# Patient Record
Sex: MALE | Race: ASIAN | NOT HISPANIC OR LATINO | Employment: FULL TIME | URBAN - METROPOLITAN AREA
[De-identification: names, ages, dates, MRNs, and addresses within clinical notes are randomized per-mention and may not be internally consistent; named-entity substitution may affect disease eponyms.]

---

## 2024-09-19 ENCOUNTER — HOSPITAL ENCOUNTER (OUTPATIENT)
Facility: HOSPITAL | Age: 49
Discharge: HOME | End: 2024-09-19
Attending: STUDENT IN AN ORGANIZED HEALTH CARE EDUCATION/TRAINING PROGRAM | Admitting: STUDENT IN AN ORGANIZED HEALTH CARE EDUCATION/TRAINING PROGRAM
Payer: MEDICAID

## 2024-09-19 ENCOUNTER — ANESTHESIA EVENT (OUTPATIENT)
Dept: ENDOSCOPY | Facility: HOSPITAL | Age: 49
End: 2024-09-19
Payer: MEDICAID

## 2024-09-19 ENCOUNTER — ANESTHESIA (OUTPATIENT)
Dept: ENDOSCOPY | Facility: HOSPITAL | Age: 49
End: 2024-09-19
Payer: MEDICAID

## 2024-09-19 DIAGNOSIS — Z12.11 COLON CANCER SCREENING: Primary | ICD-10-CM

## 2024-09-19 DIAGNOSIS — Z12.11 SCREENING FOR COLON CANCER: ICD-10-CM

## 2024-09-19 PROBLEM — K63.5 COLON POLYPS: Status: ACTIVE | Noted: 2024-09-19

## 2024-09-19 PROBLEM — K57.90 DIVERTICULOSIS: Status: ACTIVE | Noted: 2024-09-19

## 2024-09-19 PROCEDURE — 37000009 HC ANESTHESIA EA ADD 15 MINS: Performed by: STUDENT IN AN ORGANIZED HEALTH CARE EDUCATION/TRAINING PROGRAM

## 2024-09-19 PROCEDURE — 25000003 PHARM REV CODE 250: Performed by: STUDENT IN AN ORGANIZED HEALTH CARE EDUCATION/TRAINING PROGRAM

## 2024-09-19 PROCEDURE — 27201089 HC SNARE, DISP (ANY): Performed by: STUDENT IN AN ORGANIZED HEALTH CARE EDUCATION/TRAINING PROGRAM

## 2024-09-19 PROCEDURE — 45385 COLONOSCOPY W/LESION REMOVAL: CPT | Mod: 33 | Performed by: STUDENT IN AN ORGANIZED HEALTH CARE EDUCATION/TRAINING PROGRAM

## 2024-09-19 PROCEDURE — 88305 TISSUE EXAM BY PATHOLOGIST: CPT | Performed by: PATHOLOGY

## 2024-09-19 PROCEDURE — 63600175 PHARM REV CODE 636 W HCPCS: Performed by: STUDENT IN AN ORGANIZED HEALTH CARE EDUCATION/TRAINING PROGRAM

## 2024-09-19 PROCEDURE — 27201028 HC NEEDLE, SCLERO: Performed by: STUDENT IN AN ORGANIZED HEALTH CARE EDUCATION/TRAINING PROGRAM

## 2024-09-19 PROCEDURE — 45390 COLONOSCOPY W/RESECTION: CPT | Mod: 59 | Performed by: STUDENT IN AN ORGANIZED HEALTH CARE EDUCATION/TRAINING PROGRAM

## 2024-09-19 PROCEDURE — 27202363 HC INJECTION AGENT, SUBMUCOSAL, ANY: Performed by: STUDENT IN AN ORGANIZED HEALTH CARE EDUCATION/TRAINING PROGRAM

## 2024-09-19 PROCEDURE — 45385 COLONOSCOPY W/LESION REMOVAL: CPT | Mod: ,,, | Performed by: STUDENT IN AN ORGANIZED HEALTH CARE EDUCATION/TRAINING PROGRAM

## 2024-09-19 PROCEDURE — 45390 COLONOSCOPY W/RESECTION: CPT | Mod: 59,,, | Performed by: STUDENT IN AN ORGANIZED HEALTH CARE EDUCATION/TRAINING PROGRAM

## 2024-09-19 PROCEDURE — 37000008 HC ANESTHESIA 1ST 15 MINUTES: Performed by: STUDENT IN AN ORGANIZED HEALTH CARE EDUCATION/TRAINING PROGRAM

## 2024-09-19 PROCEDURE — 88305 TISSUE EXAM BY PATHOLOGIST: CPT | Mod: 26,,, | Performed by: PATHOLOGY

## 2024-09-19 RX ORDER — LIDOCAINE HYDROCHLORIDE 10 MG/ML
INJECTION, SOLUTION EPIDURAL; INFILTRATION; INTRACAUDAL; PERINEURAL
Status: DISCONTINUED | OUTPATIENT
Start: 2024-09-19 | End: 2024-09-19

## 2024-09-19 RX ORDER — DEXTROMETHORPHAN/PSEUDOEPHED 2.5-7.5/.8
DROPS ORAL
Status: DISCONTINUED | OUTPATIENT
Start: 2024-09-19 | End: 2024-09-19 | Stop reason: HOSPADM

## 2024-09-19 RX ORDER — SODIUM CHLORIDE, SODIUM LACTATE, POTASSIUM CHLORIDE, CALCIUM CHLORIDE 600; 310; 30; 20 MG/100ML; MG/100ML; MG/100ML; MG/100ML
INJECTION, SOLUTION INTRAVENOUS CONTINUOUS PRN
Status: DISCONTINUED | OUTPATIENT
Start: 2024-09-19 | End: 2024-09-19

## 2024-09-19 RX ORDER — PROPOFOL 10 MG/ML
VIAL (ML) INTRAVENOUS
Status: DISCONTINUED | OUTPATIENT
Start: 2024-09-19 | End: 2024-09-19

## 2024-09-19 RX ADMIN — SODIUM CHLORIDE, SODIUM LACTATE, POTASSIUM CHLORIDE, AND CALCIUM CHLORIDE: 600; 310; 30; 20 INJECTION, SOLUTION INTRAVENOUS at 08:09

## 2024-09-19 RX ADMIN — PROPOFOL 100 MG: 10 INJECTION, EMULSION INTRAVENOUS at 08:09

## 2024-09-19 RX ADMIN — PROPOFOL 50 MG: 10 INJECTION, EMULSION INTRAVENOUS at 08:09

## 2024-09-19 RX ADMIN — LIDOCAINE HYDROCHLORIDE 50 MG: 10 SOLUTION INTRAVENOUS at 08:09

## 2024-09-19 NOTE — PROVATION PATIENT INSTRUCTIONS
Discharge Summary/Instructions after an Endoscopic Procedure  Patient Name: Yazan Hathaway  Patient MRN: [id number]  Patient YOB: 1975 Thursday, September 19, 2024 Melania Muñiz MD  Dear patient,  As a result of recent federal legislation (The Federal Cures Act), you may   receive lab or pathology results from your procedure in your [facility]   account before your physician is able to contact you. Your physician or   their representative will relay the results to you with their   recommendations at their soonest availability.  Thank you,  RESTRICTIONS:  During your procedure today, you received medications for sedation.  These   medications may affect your judgment, balance and coordination.  Therefore,   for 24 hours, you have the following restrictions:   - DO NOT drive a car, operate machinery, make legal/financial decisions,   sign important papers or drink alcohol.    ACTIVITY:  Today: no heavy lifting, straining or running due to procedural   sedation/anesthesia.  The following day: return to full activity including work.  DIET:  Eat and drink normally unless instructed otherwise.     TREATMENT FOR COMMON SIDE EFFECTS:  - Mild abdominal pain, nausea, belching, bloating or excessive gas:  rest,   eat lightly and use a heating pad.  - Sore Throat: treat with throat lozenges and/or gargle with warm salt   water.  - Because air was used during the procedure, expelling large amounts of air   from your rectum or belching is normal.  - If a bowel prep was taken, you may not have a bowel movement for 1-3 days.    This is normal.  SYMPTOMS TO WATCH FOR AND REPORT TO YOUR PHYSICIAN:  1. Abdominal pain or bloating, other than gas cramps.  2. Chest pain.  3. Back pain.  4. Signs of infection such as: chills or fever occurring within 24 hours   after the procedure.  5. Rectal bleeding, which would show as bright red, maroon, or black stools.   (A tablespoon of blood from the rectum is not serious, especially if    hemorrhoids are present.)  6. Vomiting.  7. Weakness or dizziness.  GO DIRECTLY TO THE NEAREST EMERGENCY ROOM IF YOU HAVE ANY OF THE FOLLOWING:      Difficulty breathing              Chills and/or fever over 101 F   Persistent vomiting and/or vomiting blood   Severe abdominal pain   Severe chest pain   Black, tarry stools   Bleeding- more than one tablespoon   Any other symptom or condition that you feel may need urgent attention  Your doctor recommends these additional instructions:  If any biopsies were taken, your doctors clinic will contact you in 1 to 2   weeks with any results.  - Discharge patient to home (ambulatory).   - Patient has a contact number available for emergencies.  The signs and   symptoms of potential delayed complications were discussed with the   patient.  Return to normal activities tomorrow.  Written discharge   instructions were provided to the patient.   - Resume previous diet.   - Continue present medications.   - Return to primary care physician as previously scheduled.   - Repeat colonoscopy in 5 years for surveillance.  For questions, problems or results please call your physician Melania Muñiz MD at Work:  [phone]  If you have any questions about the above instructions, call the GI   department at [phone] or call the endoscopy unit at [phone]   from 7am until 3 pm.  [facility] [place], EMERGENCY ROOM PHONE NUMBER:   [phone]  IF A COMPLICATION OR EMERGENCY SITUATION ARISES AND YOU ARE UNABLE TO REACH   YOUR PHYSICIAN - GO DIRECTLY TO THE EMERGENCY ROOM.  I have read or have had read to me these discharge instructions for my   procedure and have received a written copy.  I understand these   instructions and will follow-up with my physician if I have any questions.     __________________________________       _____________________________________  Nurse Signature                                          Patient/Designated   Responsible  Party Signature  Melania Muñiz MD  9/19/2024 9:10:57 AM  This report has been verified and signed electronically.  Dear patient,  As a result of recent federal legislation (The Federal Cures Act), you may   receive lab or pathology results from your procedure in your [facility]   account before your physician is able to contact you. Your physician or   their representative will relay the results to you with their   recommendations at their soonest availability.  Thank you,  PROVATION

## 2024-09-19 NOTE — BRIEF OP NOTE
[facility])  Brief Operative Note     SUMMARY     Surgery Date: 9/19/2024     Surgeons and Role:     * Melania Muñiz MD - Primary    Assisting Surgeon: None    Pre-op Diagnosis:  Colon cancer screening [Z12.11]    Post-op Diagnosis:  Post-Op Diagnosis Codes:     * Colon cancer screening [Z12.11]    Procedure(s) (LRB):  COLONOSCOPY (N/A)    Anesthesia: Choice    Description of the findings of the procedure: See Op Note    Findings/Key Components: multiple polyps, diverticulosis    Estimated Blood Loss: * No values recorded between 9/19/2024  8:39 AM and 9/19/2024  9:07 AM *         Specimens:   Specimen (24h ago, onward)       Start     Ordered    09/19/24 0846  Specimen to Pathology, Surgery Gastrointestinal tract  Once        Comments: Pre-op Diagnosis: Colon cancer screening [Z12.11]Procedure(s):COLONOSCOPY 1.  Rectum polypectomy2.  Splenic flexure polypectomy     References:    Click here for ordering Quick Tip   Question Answer Comment   Procedure Type: Gastrointestinal tract    Release to patient Immediate        09/19/24 0906                    Discharge Note    SUMMARY     Admit Date: 9/19/2024    Discharge Date and Time: 9/19/2024 9:11 AM    Hospital Course Patient was seen in the preoperative area by both myself and anesthesia. All consents were verified and all questions appropriately answered. All risks, benefits and alternatives explained to patient. Patient proceeded to endoscopy suite for colonoscopy and was discharged home postoperative once cleared by anesthesia.    Final Diagnosis: Post-Op Diagnosis Codes:     * Colon cancer screening [Z12.11]    Disposition: Home or Self Care    Follow Up/Patient Instructions: See Provation report    Medications:  Reconciled Home Medications:      Medication List        CONTINUE taking these medications      atorvastatin 10 MG tablet  Commonly known as: LIPITOR  Take 1 tablet (10 mg total) by mouth every evening.     meloxicam 15 MG  tablet  Commonly known as: MOBIC  Take 1 tablet (15 mg total) by mouth daily as needed for Pain.            Discharge Procedure Orders   Diet general

## 2024-09-19 NOTE — H&P
[facility] ([facility])  Colon and Rectal Surgery  History & Physical    Patient Name: Yazan Hathaway  MRN: [id number]  Admission Date: 9/19/2024  Attending Physician: Melania Muñiz MD  Primary Care Provider: Simi Johnson MD    Patient information was obtained from patient and medical records.    Subjective:     Chief Complaint/Reason for Admission: Here for Colonoscopy    History of Present Illness:  Patient is a 49 y.o. male presents for colonoscopy. Never had one prior    Denies changes in bowel habits such as bleeding, melena, painful bowel movements, change in caliber of stool, diarrhea or constipation.    Denies FH of colorectal cancer, polyps or IBD       No current facility-administered medications on file prior to encounter.     Current Outpatient Medications on File Prior to Encounter   Medication Sig    atorvastatin (LIPITOR) 10 MG tablet Take 1 tablet (10 mg total) by mouth every evening.    meloxicam (MOBIC) 15 MG tablet Take 1 tablet (15 mg total) by mouth daily as needed for Pain.       Review of patient's allergies indicates:   Allergen Reactions    Beef containing products     Shellfish containing products        Past Medical History:   Diagnosis Date    Chronic knee pain     Hyperlipidemia     Lower leg fracture     Right, as a child     Past Surgical History:   Procedure Laterality Date    NONE       Family History       Problem Relation (Age of Onset)    Allergies Son    Diabetes Mother, Brother    Hypertension Brother          Tobacco Use    Smoking status: Every Day     Current packs/day: 0.50     Average packs/day: 0.5 packs/day for 30.0 years (15.0 ttl pk-yrs)     Types: Cigarettes    Smokeless tobacco: Never   Substance and Sexual Activity    Alcohol use: Yes     Comment: Monthly or less    Drug use: Not on file    Sexual activity: Yes     Partners: Female       Objective:     Vital Signs (Most Recent):  Temp: 98.1 °F (36.7 °C) (09/19/24 0827)  Pulse: 73 (09/19/24 0827)  Resp:  18 (09/19/24 0827)  BP: (!) 131/96 (09/19/24 0827)  SpO2: 97 % (09/19/24 0827) Vital Signs (24h Range):  Temp:  [98.1 °F (36.7 °C)] 98.1 °F (36.7 °C)  Pulse:  [73] 73  Resp:  [18] 18  SpO2:  [97 %] 97 %  BP: (131)/(96) 131/96     Weight: 65.8 kg (145 lb)  Body mass index is 24.89 kg/m².    Physical Exam  Constitutional:       Appearance: He is well-developed.   HENT:      Head: Normocephalic and atraumatic.   Eyes:      Conjunctiva/sclera: Conjunctivae normal.      Pupils: Pupils are equal, round, and reactive to light.   Neck:      Thyroid: No thyromegaly.   Cardiovascular:      Rate and Rhythm: Normal rate and regular rhythm.   Pulmonary:      Effort: Pulmonary effort is normal. No respiratory distress.   Abdominal:      General: There is no distension.      Palpations: Abdomen is soft. There is no mass.      Tenderness: There is no abdominal tenderness.   Musculoskeletal:         General: No tenderness. Normal range of motion.      Cervical back: Normal range of motion.   Skin:     General: Skin is warm and dry.      Capillary Refill: Capillary refill takes less than 2 seconds.   Neurological:      General: No focal deficit present.      Mental Status: He is alert and oriented to person, place, and time.           Assessment/Plan:     Patient is a 49 y.o. male who presents for colonoscopy     - Ok to proceed to endoscopy suite for colonoscopy  - Consent obtained. All risks, benefits and alternatives fully explained to patient, including but not limited to bleeding, infection, perforation, and missed polyps. All questions appropriately answered to patient's satisfaction. Consent signed and placed on chart.    There are no hospital problems to display for this patient.    VTE Risk Mitigation (From admission, onward)      None            Melania Muñiz MD  Colon and Rectal Surgery  [facility] ([facility])

## 2024-09-19 NOTE — PLAN OF CARE
Dr Muñiz came to bedside and discussed findings. NO N/V,  no abdominal pain, no GI bleeding, and vitals stable.  Pt discharged from unit.

## 2024-09-19 NOTE — ANESTHESIA POSTPROCEDURE EVALUATION
Anesthesia Post Evaluation    Patient: Yazan Hathaway    Procedure(s) Performed: Procedure(s) (LRB):  COLONOSCOPY (N/A)    Final Anesthesia Type: MAC      Patient location during evaluation: PACU  Patient participation: Yes- Able to Participate  Level of consciousness: awake and alert and oriented  Post-procedure vital signs: reviewed and stable  Pain management: adequate  Airway patency: patent  BROCK mitigation strategies: Multimodal analgesia and Extubation and recovery carried out in lateral, semiupright, or other nonsupine position  PONV status at discharge: No PONV  Anesthetic complications: no      Cardiovascular status: blood pressure returned to baseline and hemodynamically stable  Respiratory status: unassisted and spontaneous ventilation  Hydration status: euvolemic  Follow-up not needed.  Comments: Report given to PACU RN. Hand Off Tool Used. RN given opportunity to ask questions or clarify concerns. No Concerns verbalized. Pt. Left in stable condition. SV. Vital Signs Return to Near Baseline. No s/s of distress noted.               Vitals Value Taken Time   BP 94/63 09/19/24 0910   Temp 36.7 °C (98 °F) 09/19/24 0910   Pulse 60 09/19/24 0910   Resp 17 09/19/24 0910   SpO2 98 % 09/19/24 0910         No case tracking events are documented in the log.      Pain/Darcy Score: No data recorded

## 2024-09-19 NOTE — TRANSFER OF CARE
"Anesthesia Transfer of Care Note    Patient: Yazan Hathaway    Procedure(s) Performed: Procedure(s) (LRB):  COLONOSCOPY (N/A)    Patient location: PACU    Anesthesia Type: MAC    Transport from OR: Transported from OR on room air with adequate spontaneous ventilation    Post pain: adequate analgesia    Post assessment: no apparent anesthetic complications    Post vital signs: stable    Level of consciousness: responds to stimulation and awake    Nausea/Vomiting: no nausea/vomiting    Complications: none    Transfer of care protocol was followedComments: Report given to PACU RN at bedside. Hand off tool used. RN given opportunity to ask questions or clarify concerns. No Concerns verbalized. RN was asked if ready to assume care of patient. RN verbally confirmed. Pt. left in stable condition. SV. Vital Signs Return to Near Baseline. No s/s of distress noted.       Last vitals: Visit Vitals  BP 94/63   Pulse 60   Temp 36.7 °C (98 °F)   Resp 17   Ht 5' 4" (1.626 m)   Wt 65.8 kg (145 lb)   SpO2 98%   BMI 24.89 kg/m²     "

## 2024-09-19 NOTE — ANESTHESIA PREPROCEDURE EVALUATION
09/19/2024  Yazan Hathaway is a 49 y.o., male.      Pre-op Assessment    I have reviewed the Patient Summary Reports.          Review of Systems  Social:  Smoker       Cardiovascular:                hyperlipidemia                             Pulmonary:  Pulmonary Normal                       Renal/:  Renal/ Normal                 Hepatic/GI:  Bowel Prep.                Musculoskeletal:  Musculoskeletal Normal                Neurological:  Neurology Normal                                      Endocrine:  Diabetes, type 2    Diabetes                      Psych:  Psychiatric Normal                    Physical Exam  General: Well nourished, Cooperative, Alert and Oriented    Airway:  Mallampati: II   Mouth Opening: Normal  TM Distance: Normal  Tongue: Normal  Neck ROM: Normal ROM    Dental:  Intact        Anesthesia Plan  Type of Anesthesia, risks & benefits discussed:    Anesthesia Type: MAC, Gen Natural Airway  Intra-op Monitoring Plan: Standard ASA Monitors  Post Op Pain Control Plan: IV/PO Opioids PRN  Induction:  IV  Informed Consent: Informed consent signed with the Patient and all parties understand the risks and agree with anesthesia plan.  All questions answered.   ASA Score: 2  Day of Surgery Review of History & Physical: H&P Update referred to the surgeon/provider.    Ready For Surgery From Anesthesia Perspective.     .

## 2024-09-20 VITALS
HEART RATE: 62 BPM | OXYGEN SATURATION: 97 % | HEIGHT: 64 IN | BODY MASS INDEX: 24.75 KG/M2 | SYSTOLIC BLOOD PRESSURE: 110 MMHG | TEMPERATURE: 98 F | DIASTOLIC BLOOD PRESSURE: 89 MMHG | RESPIRATION RATE: 17 BRPM | WEIGHT: 145 LBS

## 2024-09-20 LAB
FINAL PATHOLOGIC DIAGNOSIS: NORMAL
GROSS: NORMAL
Lab: NORMAL

## 2024-09-20 NOTE — PROGRESS NOTES
Yazan Hathaway  MRN:  [id number]  49 y.o. male      SUBJECTIVE:     CHIEF COMPLAINT:  - Follow-up of recently diagnosed type 2 diabetes  - Right knee pain    HPI:  Mr. Hathaway was diagnosed with type 2 diabetes during a checkup in May, with an initial A1C of 6.6. Since then, he has made dietary changes, including reducing rice consumption and avoiding it before bedtime. He has lost approximately 7 lbs, decreasing from 152 to 144 lbs. The patient reports infrequent blood glucose level monitoring.    The patient has right knee pain during prolonged walking or squatting. A recent vacation involving extensive walking exacerbated his knee pain. He underwent surgery for a meniscus tear 2 years ago, where the orthopedist performed a meniscectomy. The patient reports minimal relief from the procedure and underwent physical therapy for several months post-surgery. He has been taking meloxicam for knee pain but reports diminished efficacy in alleviating discomfort.        Review of Systems   Constitutional:  Positive for weight loss. Negative for chills, diaphoresis, fever and malaise/fatigue.   Eyes: Negative.    Respiratory: Negative.     Cardiovascular: Negative.    Gastrointestinal: Negative.    Genitourinary:  Negative for frequency and urgency.   Musculoskeletal:  Positive for joint pain.   Skin: Negative.    Neurological: Negative.        Review of patient's allergies indicates:   Allergen Reactions    Beef containing products     Shellfish containing products        Patient Active Problem List   Diagnosis    Hyperlipidemia    Overweight with body mass index (BMI) of 25 to 25.9 in adult    Smoker    New onset type 2 diabetes mellitus    Colon polyps    Diverticulosis       Current Outpatient Medications   Medication Instructions    atorvastatin (LIPITOR) 10 mg, Oral, Nightly    meloxicam (MOBIC) 15 mg, Oral, Daily PRN         Past medical, surgical, family and social histories have been reviewed today.      OBJECTIVE:  "    Vitals:    09/23/24 1331   BP: 112/60   Pulse: 75   Resp: 18   Temp: 97.3 °F (36.3 °C)   TempSrc: Tympanic   SpO2: 97%   Weight: 65.5 kg (144 lb 8.2 oz)   Height: 5' 4" (1.626 m)     Vitals:    09/23/24 1331   PainSc: 0-No pain       Physical Exam  Vitals reviewed.   Constitutional:       General: He is not in acute distress.  Eyes:      Pupils: Pupils are equal, round, and reactive to light.   Cardiovascular:      Rate and Rhythm: Normal rate and regular rhythm.      Pulses: Normal pulses.      Heart sounds: Normal heart sounds.   Pulmonary:      Effort: Pulmonary effort is normal.      Breath sounds: Normal breath sounds.   Musculoskeletal:         General: Normal range of motion.      Cervical back: Normal range of motion and neck supple. No rigidity.      Right knee: Crepitus present. No swelling, deformity or effusion. Normal range of motion. Tenderness present. Normal alignment and normal patellar mobility. Normal pulse.      Right lower leg: No edema.      Left lower leg: No edema.        Legs:    Skin:     Capillary Refill: Capillary refill takes less than 2 seconds.   Neurological:      Mental Status: He is alert and oriented to person, place, and time.      Motor: No weakness.      Gait: Gait normal.   Psychiatric:         Mood and Affect: Mood normal.         Behavior: Behavior normal.         Thought Content: Thought content normal.         Judgment: Judgment normal.           Results for orders placed or performed in visit on 09/23/24   POCT Glucose, Hand-Held Device   Result Value Ref Range    POC Glucose 121 (A) 70 - 110 MG/DL       ASSESSMENT:     1. Type 2 diabetes mellitus without complication, without long-term current use of insulin ----- newly dx 5/2024, currently tx with healthy diet/lifestyle changes.  Weight loss noted since last visit, intentional.    -     POCT Glucose, Hand-Held Device  -     Hemoglobin A1C; Future; Expected date: 11/27/2024  -     Microalbumin/Creatinine Ratio, Urine; " Future; Expected date: 09/23/2024    Lab Results   Component Value Date     05/21/2024    K 4.2 05/21/2024     05/21/2024    CO2 21 (L) 05/21/2024    BUN 17 05/21/2024    CREATININE 0.7 05/21/2024    CALCIUM 9.5 05/21/2024    ANIONGAP 11 05/21/2024    EGFRNORACEVR >60.0 05/21/2024       Lab Results   Component Value Date    HGBA1C 6.6 (H) 05/27/2024       2. Right knee pain, unspecified chronicity ----- chronic intermittent issue with h/o right knee surgery.  Update XR today.  Pain control and knee care discussed.  -     X-ray Knee Ortho Right; Future; Expected date: 09/23/2024        PLAN:     Urine and XR today, pending.  A1c due 11/27/24.  RTC as directed and/or prn.        KANCHAN Alicea  [facility]       30 minutes of total time spent on the encounter, which includes face to face time and non-face to face time preparing to see the patient.  This includes obtaining and/or reviewing separately obtained history, performing a medically appropriate examination and/or evaluation, and counseling and educating the patient/family/caregiver.  Includes documenting clinical information in the electronic or other health record, independently interpreting results (not separately reported) and communicating results to the patient/family/caregiver, with care coordination (not separately reported).  Medications, tests and/or procedures ordered as necessary along with referring and communicating with other health professionals (when not separately reported).    This note was generated with the assistance of ambient listening technology. Verbal consent was obtained by the patient and accompanying visitor(s) for the recording of patient appointment to facilitate this note. I attest to having reviewed and edited the generated note for accuracy, though some syntax or spelling errors may persist. Please contact the author of this note for any clarification.

## 2024-09-23 ENCOUNTER — HOSPITAL ENCOUNTER (OUTPATIENT)
Dept: RADIOLOGY | Facility: HOSPITAL | Age: 49
Discharge: HOME | End: 2024-09-23
Attending: REGISTERED NURSE
Payer: MEDICAID

## 2024-09-23 ENCOUNTER — OFFICE VISIT (OUTPATIENT)
Dept: FAMILY MEDICINE | Facility: CLINIC | Age: 49
End: 2024-09-23
Payer: MEDICAID

## 2024-09-23 VITALS
WEIGHT: 144.5 LBS | TEMPERATURE: 97 F | OXYGEN SATURATION: 97 % | DIASTOLIC BLOOD PRESSURE: 60 MMHG | HEIGHT: 64 IN | SYSTOLIC BLOOD PRESSURE: 112 MMHG | BODY MASS INDEX: 24.67 KG/M2 | HEART RATE: 75 BPM | RESPIRATION RATE: 18 BRPM

## 2024-09-23 DIAGNOSIS — M25.561 RIGHT KNEE PAIN, UNSPECIFIED CHRONICITY: ICD-10-CM

## 2024-09-23 DIAGNOSIS — E11.9 TYPE 2 DIABETES MELLITUS WITHOUT COMPLICATION, WITHOUT LONG-TERM CURRENT USE OF INSULIN: Primary | ICD-10-CM

## 2024-09-23 LAB — GLUCOSE SERPL-MCNC: 121 MG/DL (ref 70–110)

## 2024-09-23 PROCEDURE — 99999 PR PBB SHADOW E&M-EST. PATIENT-LVL III: CPT | Mod: PBBFAC,,, | Performed by: REGISTERED NURSE

## 2024-09-23 PROCEDURE — 73560 X-RAY EXAM OF KNEE 1 OR 2: CPT | Mod: TC,FY,PO,LT

## 2024-09-23 PROCEDURE — 73562 X-RAY EXAM OF KNEE 3: CPT | Mod: 26,59,LT, | Performed by: RADIOLOGY

## 2024-09-23 PROCEDURE — 3078F DIAST BP <80 MM HG: CPT | Mod: CPTII,,, | Performed by: REGISTERED NURSE

## 2024-09-23 PROCEDURE — 3008F BODY MASS INDEX DOCD: CPT | Mod: CPTII,,, | Performed by: REGISTERED NURSE

## 2024-09-23 PROCEDURE — 82962 GLUCOSE BLOOD TEST: CPT | Mod: PBBFAC,PO | Performed by: REGISTERED NURSE

## 2024-09-23 PROCEDURE — 99213 OFFICE O/P EST LOW 20 MIN: CPT | Mod: PBBFAC,25,PO | Performed by: REGISTERED NURSE

## 2024-09-23 PROCEDURE — 3044F HG A1C LEVEL LT 7.0%: CPT | Mod: CPTII,,, | Performed by: REGISTERED NURSE

## 2024-09-23 PROCEDURE — 73564 X-RAY EXAM KNEE 4 OR MORE: CPT | Mod: 26,RT,, | Performed by: RADIOLOGY

## 2024-09-23 PROCEDURE — 3074F SYST BP LT 130 MM HG: CPT | Mod: CPTII,,, | Performed by: REGISTERED NURSE

## 2024-09-23 PROCEDURE — 99214 OFFICE O/P EST MOD 30 MIN: CPT | Mod: S$PBB,,, | Performed by: REGISTERED NURSE

## 2024-09-23 PROCEDURE — 3061F NEG MICROALBUMINURIA REV: CPT | Mod: CPTII,,, | Performed by: REGISTERED NURSE

## 2024-09-23 PROCEDURE — 73562 X-RAY EXAM OF KNEE 3: CPT | Mod: TC,FY,PO,RT

## 2024-09-23 PROCEDURE — G2211 COMPLEX E/M VISIT ADD ON: HCPCS | Mod: S$PBB,,, | Performed by: REGISTERED NURSE

## 2024-09-23 PROCEDURE — 3066F NEPHROPATHY DOC TX: CPT | Mod: CPTII,,, | Performed by: REGISTERED NURSE

## 2024-09-23 PROCEDURE — 99999PBSHW POCT GLUCOSE, HAND-HELD DEVICE: Mod: PBBFAC,,,

## 2024-10-16 ENCOUNTER — PATIENT MESSAGE (OUTPATIENT)
Dept: RESEARCH | Facility: HOSPITAL | Age: 49
End: 2024-10-16
Payer: MEDICAID

## 2024-11-05 ENCOUNTER — PATIENT MESSAGE (OUTPATIENT)
Dept: RESEARCH | Facility: HOSPITAL | Age: 49
End: 2024-11-05
Payer: MEDICAID

## 2024-12-01 DIAGNOSIS — M25.569 KNEE PAIN, UNSPECIFIED CHRONICITY, UNSPECIFIED LATERALITY: ICD-10-CM

## 2024-12-02 RX ORDER — MELOXICAM 15 MG/1
15 TABLET ORAL DAILY PRN
Qty: 90 TABLET | Refills: 0 | Status: SHIPPED | OUTPATIENT
Start: 2024-12-02

## 2024-12-30 ENCOUNTER — TELEPHONE (OUTPATIENT)
Dept: FAMILY MEDICINE | Facility: CLINIC | Age: 49
End: 2024-12-30
Payer: MEDICAID

## 2024-12-30 NOTE — TELEPHONE ENCOUNTER
----- Message from Martha sent at 12/30/2024  1:15 PM CST -----  Contact: Joel  Patient needing to schedule an appointment for routine blood work, please call back [phone]

## 2024-12-31 ENCOUNTER — TELEPHONE (OUTPATIENT)
Dept: FAMILY MEDICINE | Facility: CLINIC | Age: 49
End: 2024-12-31
Payer: MEDICAID

## 2024-12-31 NOTE — TELEPHONE ENCOUNTER
----- Message from Casandra sent at 12/30/2024  3:56 PM CST -----  Contact: Joel Maldonado needs a call back at [phone] in regards to rescheduling appointment on 01/23. She stated that they will be out of town on that day and wont be able to make it.    Thanks

## 2025-03-15 ENCOUNTER — HOSPITAL ENCOUNTER (EMERGENCY)
Facility: HOSPITAL | Age: 50
Discharge: HOME | End: 2025-03-15
Attending: EMERGENCY MEDICINE
Payer: MEDICAID

## 2025-03-15 VITALS
DIASTOLIC BLOOD PRESSURE: 58 MMHG | RESPIRATION RATE: 20 BRPM | BODY MASS INDEX: 25.4 KG/M2 | WEIGHT: 148 LBS | TEMPERATURE: 99 F | SYSTOLIC BLOOD PRESSURE: 100 MMHG | OXYGEN SATURATION: 95 % | HEART RATE: 91 BPM

## 2025-03-15 DIAGNOSIS — R06.2 WHEEZING: Primary | ICD-10-CM

## 2025-03-15 DIAGNOSIS — R06.02 SHORTNESS OF BREATH: ICD-10-CM

## 2025-03-15 DIAGNOSIS — R06.02 SOB (SHORTNESS OF BREATH): ICD-10-CM

## 2025-03-15 LAB
ALBUMIN SERPL BCP-MCNC: 3.9 G/DL (ref 3.5–5.2)
ALP SERPL-CCNC: 71 U/L (ref 40–150)
ALT SERPL W/O P-5'-P-CCNC: 24 U/L (ref 10–44)
ANION GAP SERPL CALC-SCNC: 14 MMOL/L (ref 8–16)
AST SERPL-CCNC: 17 U/L (ref 10–40)
BASOPHILS # BLD AUTO: 0.05 K/UL (ref 0–0.2)
BASOPHILS NFR BLD: 0.5 % (ref 0–1.9)
BILIRUB SERPL-MCNC: 0.3 MG/DL (ref 0.1–1)
BNP SERPL-MCNC: <10 PG/ML (ref 0–99)
BUN SERPL-MCNC: 15 MG/DL (ref 6–20)
CALCIUM SERPL-MCNC: 9 MG/DL (ref 8.7–10.5)
CHLORIDE SERPL-SCNC: 107 MMOL/L (ref 95–110)
CO2 SERPL-SCNC: 18 MMOL/L (ref 23–29)
CREAT SERPL-MCNC: 0.7 MG/DL (ref 0.5–1.4)
DIFFERENTIAL METHOD BLD: ABNORMAL
EOSINOPHIL # BLD AUTO: 0 K/UL (ref 0–0.5)
EOSINOPHIL NFR BLD: 0.3 % (ref 0–8)
ERYTHROCYTE [DISTWIDTH] IN BLOOD BY AUTOMATED COUNT: 13.9 % (ref 11.5–14.5)
EST. GFR  (NO RACE VARIABLE): >60 ML/MIN/1.73 M^2
GLUCOSE SERPL-MCNC: 144 MG/DL (ref 70–110)
HCT VFR BLD AUTO: 46.6 % (ref 40–54)
HGB BLD-MCNC: 15.9 G/DL (ref 14–18)
IMM GRANULOCYTES # BLD AUTO: 0.06 K/UL (ref 0–0.04)
IMM GRANULOCYTES NFR BLD AUTO: 0.6 % (ref 0–0.5)
INFLUENZA A, MOLECULAR: NEGATIVE
INFLUENZA B, MOLECULAR: NEGATIVE
LYMPHOCYTES # BLD AUTO: 0.8 K/UL (ref 1–4.8)
LYMPHOCYTES NFR BLD: 7.6 % (ref 18–48)
MCH RBC QN AUTO: 29.7 PG (ref 27–31)
MCHC RBC AUTO-ENTMCNC: 34.1 G/DL (ref 32–36)
MCV RBC AUTO: 87 FL (ref 82–98)
MONOCYTES # BLD AUTO: 0.4 K/UL (ref 0.3–1)
MONOCYTES NFR BLD: 4 % (ref 4–15)
NEUTROPHILS # BLD AUTO: 8.8 K/UL (ref 1.8–7.7)
NEUTROPHILS NFR BLD: 87 % (ref 38–73)
NRBC BLD-RTO: 0 /100 WBC
OHS QRS DURATION: 90 MS
OHS QTC CALCULATION: 445 MS
PLATELET # BLD AUTO: 196 K/UL (ref 150–450)
PMV BLD AUTO: 9.7 FL (ref 9.2–12.9)
POTASSIUM SERPL-SCNC: 3.9 MMOL/L (ref 3.5–5.1)
PROT SERPL-MCNC: 6.6 G/DL (ref 6–8.4)
RBC # BLD AUTO: 5.36 M/UL (ref 4.6–6.2)
SARS-COV-2 RDRP RESP QL NAA+PROBE: NEGATIVE
SODIUM SERPL-SCNC: 139 MMOL/L (ref 136–145)
SPECIMEN SOURCE: NORMAL
TROPONIN I SERPL DL<=0.01 NG/ML-MCNC: <0.006 NG/ML (ref 0–0.03)
WBC # BLD AUTO: 10.07 K/UL (ref 3.9–12.7)

## 2025-03-15 PROCEDURE — 99285 EMERGENCY DEPT VISIT HI MDM: CPT | Mod: 25

## 2025-03-15 PROCEDURE — 93005 ELECTROCARDIOGRAM TRACING: CPT

## 2025-03-15 PROCEDURE — 96374 THER/PROPH/DIAG INJ IV PUSH: CPT

## 2025-03-15 PROCEDURE — 85025 COMPLETE CBC W/AUTO DIFF WBC: CPT | Performed by: EMERGENCY MEDICINE

## 2025-03-15 PROCEDURE — 87635 SARS-COV-2 COVID-19 AMP PRB: CPT | Performed by: EMERGENCY MEDICINE

## 2025-03-15 PROCEDURE — 80053 COMPREHEN METABOLIC PANEL: CPT | Performed by: EMERGENCY MEDICINE

## 2025-03-15 PROCEDURE — 25000242 PHARM REV CODE 250 ALT 637 W/ HCPCS: Performed by: EMERGENCY MEDICINE

## 2025-03-15 PROCEDURE — 87502 INFLUENZA DNA AMP PROBE: CPT | Performed by: EMERGENCY MEDICINE

## 2025-03-15 PROCEDURE — 63600175 PHARM REV CODE 636 W HCPCS: Mod: JZ,TB | Performed by: EMERGENCY MEDICINE

## 2025-03-15 PROCEDURE — 93010 ELECTROCARDIOGRAM REPORT: CPT | Mod: ,,, | Performed by: INTERNAL MEDICINE

## 2025-03-15 PROCEDURE — 84484 ASSAY OF TROPONIN QUANT: CPT | Performed by: EMERGENCY MEDICINE

## 2025-03-15 PROCEDURE — 83880 ASSAY OF NATRIURETIC PEPTIDE: CPT | Performed by: EMERGENCY MEDICINE

## 2025-03-15 RX ORDER — PREDNISONE 10 MG/1
40 TABLET ORAL DAILY
Qty: 20 TABLET | Refills: 0 | Status: SHIPPED | OUTPATIENT
Start: 2025-03-15 | End: 2025-03-20

## 2025-03-15 RX ORDER — IPRATROPIUM BROMIDE AND ALBUTEROL SULFATE 2.5; .5 MG/3ML; MG/3ML
3 SOLUTION RESPIRATORY (INHALATION)
Status: COMPLETED | OUTPATIENT
Start: 2025-03-15 | End: 2025-03-15

## 2025-03-15 RX ORDER — METHYLPREDNISOLONE SOD SUCC 125 MG
125 VIAL (EA) INJECTION
Status: COMPLETED | OUTPATIENT
Start: 2025-03-15 | End: 2025-03-15

## 2025-03-15 RX ORDER — ALBUTEROL SULFATE 90 UG/1
1-2 INHALANT RESPIRATORY (INHALATION) EVERY 4 HOURS PRN
Qty: 8 G | Refills: 0 | Status: SHIPPED | OUTPATIENT
Start: 2025-03-15 | End: 2026-03-15

## 2025-03-15 RX ADMIN — IPRATROPIUM BROMIDE AND ALBUTEROL SULFATE 3 ML: 2.5; .5 SOLUTION RESPIRATORY (INHALATION) at 12:03

## 2025-03-15 RX ADMIN — METHYLPREDNISOLONE SODIUM SUCCINATE 125 MG: 125 INJECTION, POWDER, FOR SOLUTION INTRAMUSCULAR; INTRAVENOUS at 12:03

## 2025-03-15 NOTE — ED PROVIDER NOTES
SCRIBE #1 NOTE: I, Kenna Rosen, am scribing for, and in the presence of, Estbean Maxwell MD. I have scribed the entire note.       History     Chief Complaint   Patient presents with    Shortness of Breath     SOB that's worsened over past 2 days, accessory muscle use; cough, congestion     Review of patient's allergies indicates:   Allergen Reactions    Beef containing products     Shellfish containing products          History of Present Illness     HPI    3/15/2025, 12:37 AM  History obtained from the patient and spouse      History of Present Illness: Yazan Hathaway is a 49 y.o. male patient with a PMHx of HLD who presents to the Emergency Department for evaluation of increased SOB which onset 2 days ago. Pt is an active smoker. Symptoms are constant and moderate in severity. Pt reports laying down worsens SOB. Deep inspiration does not help. Pt reports deep inspiration is short and feels like it is not a full breath. Associated sxs include productive cough. Patient denies any chest pain, fever, sore throat, rhinorrhea, and all other sxs at this time. Prior Tx includes inhaler with no relief. No further complaints or concerns at this time.       Arrival mode: Personal vehicle    PCP: Simi Johnson MD        Past Medical History:  Past Medical History:   Diagnosis Date    Chronic knee pain     Hyperlipidemia     Lower leg fracture     Right, as a child       Past Surgical History:  Past Surgical History:   Procedure Laterality Date    COLONOSCOPY N/A 9/19/2024    Procedure: COLONOSCOPY;  Surgeon: Melania Muñiz MD;  Location: [facility];  Service: Gastroenterology;  Laterality: N/A;    NONE           Family History:  Family History   Problem Relation Name Age of Onset    Diabetes Mother      Hypertension Brother      Diabetes Brother      Allergies Son         Social History:  Social History     Tobacco Use    Smoking status: Every Day     Current packs/day: 0.50     Average packs/day: 0.5 packs/day  for 30.0 years (15.0 ttl pk-yrs)     Types: Cigarettes    Smokeless tobacco: Never   Substance and Sexual Activity    Alcohol use: Yes     Comment: Monthly or less    Drug use: Never    Sexual activity: Yes     Partners: Female        Review of Systems     Review of Systems   Constitutional:  Negative for fever.   HENT:  Negative for rhinorrhea and sore throat.    Respiratory:  Positive for cough (Productive) and shortness of breath.    Cardiovascular:  Negative for chest pain.   Gastrointestinal:  Negative for nausea.   Genitourinary:  Negative for dysuria.   Musculoskeletal:  Negative for back pain.   Skin:  Negative for rash.   Neurological:  Negative for weakness.   Hematological:  Does not bruise/bleed easily.   All other systems reviewed and are negative.     Physical Exam     Initial Vitals [03/15/25 0014]   BP Pulse Resp Temp SpO2   (!) 143/83 88 16 98.8 °F (37.1 °C) (!) 93 %      MAP       --          Physical Exam  Nursing Notes and Vital Signs Reviewed.  Constitutional: Patient is in no acute distress. Well-developed and well-nourished.  Head: Atraumatic. Normocephalic.  Eyes: PERRL. EOM intact. Conjunctivae are not pale. No scleral icterus.  ENT: Mucous membranes are moist. Oropharynx is clear and symmetric.    Neck: Supple. Full ROM. No lymphadenopathy.  Cardiovascular: Regular rate. Regular rhythm. No murmurs, rubs, or gallops. Distal pulses are 2+ and symmetric.  Pulmonary/Chest: No respiratory distress. Bilateral wheezing.  Abdominal: Soft and non-distended.  There is no tenderness.  No rebound, guarding, or rigidity. Good bowel sounds.  Genitourinary: No CVA tenderness  Musculoskeletal: Moves all extremities. No obvious deformities. No edema. No calf tenderness.  Skin: Warm and dry.  Neurological:  Alert, awake, and appropriate.  Normal speech.  No acute focal neurological deficits are appreciated.  Psychiatric: Normal affect. Good eye contact. Appropriate in content.     ED Course   Procedures  ED  Vital Signs:  Vitals:    03/15/25 0014 03/15/25 0032 03/15/25 0044 03/15/25 0057   BP: (!) 143/83 (!) 128/96     Pulse: 88 88 85 80   Resp: 16 (!) 21 (!) 22 20   Temp: 98.8 °F (37.1 °C)      TempSrc: Oral      SpO2: (!) 93% 95% 95% 95%   Weight: 67.1 kg (148 lb)       03/15/25 0114 03/15/25 0148   BP:  (!) 100/58   Pulse: 89 91   Resp: (!) 24 20   Temp:     TempSrc:     SpO2: (!) 94% 95%   Weight:         Abnormal Lab Results:  Labs Reviewed   CBC W/ AUTO DIFFERENTIAL - Abnormal       Result Value    WBC 10.07      RBC 5.36      Hemoglobin 15.9      Hematocrit 46.6      MCV 87      MCH 29.7      MCHC 34.1      RDW 13.9      Platelets 196      MPV 9.7      Immature Granulocytes 0.6 (*)     Gran # (ANC) 8.8 (*)     Immature Grans (Abs) 0.06 (*)     Lymph # 0.8 (*)     Mono # 0.4      Eos # 0.0      Baso # 0.05      nRBC 0      Gran % 87.0 (*)     Lymph % 7.6 (*)     Mono % 4.0      Eosinophil % 0.3      Basophil % 0.5      Differential Method Automated     COMPREHENSIVE METABOLIC PANEL - Abnormal    Sodium 139      Potassium 3.9      Chloride 107      CO2 18 (*)     Glucose 144 (*)     BUN 15      Creatinine 0.7      Calcium 9.0      Total Protein 6.6      Albumin 3.9      Total Bilirubin 0.3      Alkaline Phosphatase 71      AST 17      ALT 24      eGFR >60      Anion Gap 14     INFLUENZA A & B BY MOLECULAR    Influenza A, Molecular Negative      Influenza B, Molecular Negative      Flu A & B Source Nasal swab     SARS-COV-2 RNA AMPLIFICATION, QUAL    SARS-CoV-2 RNA, Amplification, Qual Negative     B-TYPE NATRIURETIC PEPTIDE    BNP <10     TROPONIN I    Troponin I <0.006          All Lab Results:  Results for orders placed or performed during the hospital encounter of 03/15/25   Influenza A & B by Molecular    Collection Time: 03/15/25 12:39 AM    Specimen: Nasopharyngeal Swab   Result Value Ref Range    Influenza A, Molecular Negative Negative    Influenza B, Molecular Negative Negative    Flu A & B Source Nasal  swab    COVID-19 Rapid Screening    Collection Time: 03/15/25 12:39 AM   Result Value Ref Range    SARS-CoV-2 RNA, Amplification, Qual Negative Negative   CBC auto differential    Collection Time: 03/15/25 12:56 AM   Result Value Ref Range    WBC 10.07 3.90 - 12.70 K/uL    RBC 5.36 4.60 - 6.20 M/uL    Hemoglobin 15.9 14.0 - 18.0 g/dL    Hematocrit 46.6 40.0 - 54.0 %    MCV 87 82 - 98 fL    MCH 29.7 27.0 - 31.0 pg    MCHC 34.1 32.0 - 36.0 g/dL    RDW 13.9 11.5 - 14.5 %    Platelets 196 150 - 450 K/uL    MPV 9.7 9.2 - 12.9 fL    Immature Granulocytes 0.6 (H) 0.0 - 0.5 %    Gran # (ANC) 8.8 (H) 1.8 - 7.7 K/uL    Immature Grans (Abs) 0.06 (H) 0.00 - 0.04 K/uL    Lymph # 0.8 (L) 1.0 - 4.8 K/uL    Mono # 0.4 0.3 - 1.0 K/uL    Eos # 0.0 0.0 - 0.5 K/uL    Baso # 0.05 0.00 - 0.20 K/uL    nRBC 0 0 /100 WBC    Gran % 87.0 (H) 38.0 - 73.0 %    Lymph % 7.6 (L) 18.0 - 48.0 %    Mono % 4.0 4.0 - 15.0 %    Eosinophil % 0.3 0.0 - 8.0 %    Basophil % 0.5 0.0 - 1.9 %    Differential Method Automated    Comprehensive metabolic panel    Collection Time: 03/15/25 12:56 AM   Result Value Ref Range    Sodium 139 136 - 145 mmol/L    Potassium 3.9 3.5 - 5.1 mmol/L    Chloride 107 95 - 110 mmol/L    CO2 18 (L) 23 - 29 mmol/L    Glucose 144 (H) 70 - 110 mg/dL    BUN 15 6 - 20 mg/dL    Creatinine 0.7 0.5 - 1.4 mg/dL    Calcium 9.0 8.7 - 10.5 mg/dL    Total Protein 6.6 6.0 - 8.4 g/dL    Albumin 3.9 3.5 - 5.2 g/dL    Total Bilirubin 0.3 0.1 - 1.0 mg/dL    Alkaline Phosphatase 71 40 - 150 U/L    AST 17 10 - 40 U/L    ALT 24 10 - 44 U/L    eGFR >60 >60 mL/min/1.73 m^2    Anion Gap 14 8 - 16 mmol/L   Brain natriuretic peptide    Collection Time: 03/15/25 12:56 AM   Result Value Ref Range    BNP <10 0 - 99 pg/mL   Troponin I    Collection Time: 03/15/25 12:56 AM   Result Value Ref Range    Troponin I <0.006 0.000 - 0.026 ng/mL       Imaging Results:  Imaging Results              X-Ray Chest AP Portable (Preliminary result)  Result time 03/15/25  02:01:06      Wet Read by Esteban Maxwell MD (03/15/25 02:01:06, [facility] - Emergency Dept., Emergency Medicine)    No acute findings                                     The EKG was ordered, reviewed, and independently interpreted by the ED provider.  Interpretation time: 00:12  Rate: 89 BPM  Rhythm: normal sinus rhythm  Interpretation: Right superior axis deviation. Right ventricular hypertrophy. No STEMI.           The Emergency Provider reviewed the vital signs and test results, which are outlined above.     ED Discussion     1:54 AM: Reassessed pt at this time. Discussed with pt all pertinent ED information and results. Discussed pt dx and plan of tx. Gave pt all f/u and return to the ED instructions. All questions and concerns were addressed at this time. Pt expresses understanding of information and instructions, and is comfortable with plan to discharge. Pt is stable for discharge.    I discussed with patient and/or family/caretaker that evaluation in the ED does not suggest any emergent or life threatening medical conditions requiring immediate intervention beyond what was provided in the ED, and I believe patient is safe for discharge.  Regardless, an unremarkable evaluation in the ED does not preclude the development or presence of a serious of life threatening condition. As such, patient was instructed to return immediately for any worsening or change in current symptoms.    ED Course as of 03/15/25 0216   Sat Mar 15, 2025   0043 DDx includes bronchospasm, viral syndrome, HF, ACS, PNA, PTX, COPD [BA]   0159 Patient feeling better [BA]      ED Course User Index  [BA] Esteban Maxwell MD     Medical Decision Making  Amount and/or Complexity of Data Reviewed  Labs: ordered. Decision-making details documented in ED Course.  Radiology: ordered and independent interpretation performed. Decision-making details documented in ED Course.  ECG/medicine tests: ordered and independent interpretation performed.  Decision-making details documented in ED Course.    Risk  Prescription drug management.                ED Medication(s):  Medications   methylPREDNISolone sodium succinate injection 125 mg (125 mg Intravenous Given 3/15/25 0054)   albuterol-ipratropium 2.5 mg-0.5 mg/3 mL nebulizer solution 3 mL (3 mLs Nebulization Given 3/15/25 0057)       Discharge Medication List as of 3/15/2025  2:01 AM        START taking these medications    Details   albuterol (PROVENTIL/VENTOLIN HFA) 90 mcg/actuation inhaler Inhale 1-2 puffs into the lungs every 4 (four) hours as needed for Wheezing. Rescue, Starting Sat 3/15/2025, Until Sun 3/15/2026 at 2359, Normal      predniSONE (DELTASONE) 10 MG tablet Take 4 tablets (40 mg total) by mouth once daily. for 5 days, Starting Sat 3/15/2025, Until Thu 3/20/2025, Normal              Follow-up Information       Simi Johnson MD. Schedule an appointment as soon as possible for a visit in 2 days.    Specialty: Family Medicine  Why: For re-evaluation and further treatment  Contact information:  [address] [facility] [address] [phone]               [facility].    Specialty: Emergency Medicine  Why: If symptoms worsen, For re-evaluation and further treatment, As needed  Contact information:  [address] [facility] [address] [phone]                               Scribe Attestation:   Scribe #1: I performed the above scribed service and the documentation accurately describes the services I performed. I attest to the accuracy of the note.     Attending:   Physician Attestation Statement for Scribe #1: I, Esteban Maxwell MD, personally performed the services described in this documentation, as scribed by Kenna Rosen, in my presence, and it is both accurate and complete.           Clinical Impression       ICD-10-CM ICD-9-CM   1. Wheezing  R06.2 786.07   2. Shortness of breath  R06.02 786.05   3. SOB (shortness of breath)   R06.02 786.05       Disposition:   Disposition: Discharged  Condition: Stable       Esteban Maxwell MD  03/15/25 0216

## 2025-05-20 DIAGNOSIS — M25.569 KNEE PAIN, UNSPECIFIED CHRONICITY, UNSPECIFIED LATERALITY: ICD-10-CM

## 2025-05-20 RX ORDER — MELOXICAM 15 MG/1
15 TABLET ORAL DAILY PRN
Qty: 90 TABLET | Refills: 0 | Status: SHIPPED | OUTPATIENT
Start: 2025-05-20

## 2025-05-28 DIAGNOSIS — E11.9 TYPE 2 DIABETES MELLITUS WITHOUT COMPLICATION: ICD-10-CM

## 2025-05-29 ENCOUNTER — PATIENT OUTREACH (OUTPATIENT)
Dept: ADMINISTRATIVE | Facility: HOSPITAL | Age: 50
End: 2025-05-29
Payer: MEDICAID

## 2025-06-03 ENCOUNTER — LAB VISIT (OUTPATIENT)
Dept: LAB | Facility: HOSPITAL | Age: 50
End: 2025-06-03
Attending: FAMILY MEDICINE
Payer: MEDICAID

## 2025-06-03 ENCOUNTER — PATIENT MESSAGE (OUTPATIENT)
Dept: CARDIOLOGY | Facility: HOSPITAL | Age: 50
End: 2025-06-03
Payer: MEDICAID

## 2025-06-03 ENCOUNTER — OFFICE VISIT (OUTPATIENT)
Dept: FAMILY MEDICINE | Facility: CLINIC | Age: 50
End: 2025-06-03
Payer: MEDICAID

## 2025-06-03 VITALS
HEART RATE: 70 BPM | DIASTOLIC BLOOD PRESSURE: 80 MMHG | WEIGHT: 146.19 LBS | TEMPERATURE: 97 F | SYSTOLIC BLOOD PRESSURE: 132 MMHG | OXYGEN SATURATION: 98 % | BODY MASS INDEX: 25.09 KG/M2

## 2025-06-03 DIAGNOSIS — R53.83 FATIGUE, UNSPECIFIED TYPE: ICD-10-CM

## 2025-06-03 DIAGNOSIS — R06.09 EXERTIONAL DYSPNEA: ICD-10-CM

## 2025-06-03 DIAGNOSIS — F17.200 TOBACCO USE DISORDER: ICD-10-CM

## 2025-06-03 DIAGNOSIS — E66.3 OVERWEIGHT WITH BODY MASS INDEX (BMI) OF 25 TO 25.9 IN ADULT: ICD-10-CM

## 2025-06-03 DIAGNOSIS — Z00.00 PREVENTATIVE HEALTH CARE: ICD-10-CM

## 2025-06-03 DIAGNOSIS — Z12.5 PROSTATE CANCER SCREENING: ICD-10-CM

## 2025-06-03 DIAGNOSIS — Z23 NEED FOR VACCINATION: ICD-10-CM

## 2025-06-03 DIAGNOSIS — Z00.00 PREVENTATIVE HEALTH CARE: Primary | ICD-10-CM

## 2025-06-03 DIAGNOSIS — E78.5 HYPERLIPIDEMIA, UNSPECIFIED HYPERLIPIDEMIA TYPE: ICD-10-CM

## 2025-06-03 DIAGNOSIS — F17.200 NEEDS SMOKING CESSATION EDUCATION: ICD-10-CM

## 2025-06-03 PROBLEM — E11.9 TYPE 2 DIABETES MELLITUS WITHOUT COMPLICATION, WITHOUT LONG-TERM CURRENT USE OF INSULIN: Chronic | Status: ACTIVE | Noted: 2024-05-22

## 2025-06-03 LAB
ABSOLUTE EOSINOPHIL (OHS): 0.16 K/UL
ABSOLUTE MONOCYTE (OHS): 0.71 K/UL (ref 0.3–1)
ABSOLUTE NEUTROPHIL COUNT (OHS): 6.72 K/UL (ref 1.8–7.7)
ALBUMIN SERPL BCP-MCNC: 4.3 G/DL (ref 3.5–5.2)
ALBUMIN/CREAT UR: 5.4 UG/MG
ALP SERPL-CCNC: 63 UNIT/L (ref 40–150)
ALT SERPL W/O P-5'-P-CCNC: 24 UNIT/L (ref 10–44)
ANION GAP (OHS): 11 MMOL/L (ref 8–16)
AST SERPL-CCNC: 13 UNIT/L (ref 11–45)
BASOPHILS # BLD AUTO: 0.07 K/UL
BASOPHILS NFR BLD AUTO: 0.6 %
BILIRUB SERPL-MCNC: 0.5 MG/DL (ref 0.1–1)
BUN SERPL-MCNC: 14 MG/DL (ref 6–20)
CALCIUM SERPL-MCNC: 9.3 MG/DL (ref 8.7–10.5)
CHLORIDE SERPL-SCNC: 110 MMOL/L (ref 95–110)
CHOLEST SERPL-MCNC: 179 MG/DL (ref 120–199)
CHOLEST/HDLC SERPL: 2.9 {RATIO} (ref 2–5)
CO2 SERPL-SCNC: 21 MMOL/L (ref 23–29)
CREAT SERPL-MCNC: 0.6 MG/DL (ref 0.5–1.4)
CREAT UR-MCNC: 111 MG/DL (ref 23–375)
EAG (OHS): 140 MG/DL (ref 68–131)
ERYTHROCYTE [DISTWIDTH] IN BLOOD BY AUTOMATED COUNT: 14 % (ref 11.5–14.5)
GFR SERPLBLD CREATININE-BSD FMLA CKD-EPI: >60 ML/MIN/1.73/M2
GLUCOSE SERPL-MCNC: 103 MG/DL (ref 70–110)
HBA1C MFR BLD: 6.5 % (ref 4–5.6)
HCT VFR BLD AUTO: 52.5 % (ref 40–54)
HDLC SERPL-MCNC: 62 MG/DL (ref 40–75)
HDLC SERPL: 34.6 % (ref 20–50)
HGB BLD-MCNC: 16.6 GM/DL (ref 14–18)
IMM GRANULOCYTES # BLD AUTO: 0.06 K/UL (ref 0–0.04)
IMM GRANULOCYTES NFR BLD AUTO: 0.5 % (ref 0–0.5)
LDLC SERPL CALC-MCNC: 96.6 MG/DL (ref 63–159)
LYMPHOCYTES # BLD AUTO: 4 K/UL (ref 1–4.8)
MCH RBC QN AUTO: 28.7 PG (ref 27–31)
MCHC RBC AUTO-ENTMCNC: 31.6 G/DL (ref 32–36)
MCV RBC AUTO: 91 FL (ref 82–98)
MICROALBUMIN UR-MCNC: 6 UG/ML (ref ?–5000)
NONHDLC SERPL-MCNC: 117 MG/DL
NUCLEATED RBC (/100WBC) (OHS): 0 /100 WBC
PLATELET # BLD AUTO: 240 K/UL (ref 150–450)
PMV BLD AUTO: 11.2 FL (ref 9.2–12.9)
POTASSIUM SERPL-SCNC: 4 MMOL/L (ref 3.5–5.1)
PROT SERPL-MCNC: 7 GM/DL (ref 6–8.4)
PSA SERPL-MCNC: 0.24 NG/ML
RBC # BLD AUTO: 5.78 M/UL (ref 4.6–6.2)
RELATIVE EOSINOPHIL (OHS): 1.4 %
RELATIVE LYMPHOCYTE (OHS): 34.1 % (ref 18–48)
RELATIVE MONOCYTE (OHS): 6.1 % (ref 4–15)
RELATIVE NEUTROPHIL (OHS): 57.3 % (ref 38–73)
SODIUM SERPL-SCNC: 142 MMOL/L (ref 136–145)
TRIGL SERPL-MCNC: 102 MG/DL (ref 30–150)
TSH SERPL-ACNC: 1.06 UIU/ML (ref 0.4–4)
WBC # BLD AUTO: 11.72 K/UL (ref 3.9–12.7)

## 2025-06-03 PROCEDURE — 84443 ASSAY THYROID STIM HORMONE: CPT

## 2025-06-03 PROCEDURE — 83036 HEMOGLOBIN GLYCOSYLATED A1C: CPT

## 2025-06-03 PROCEDURE — 80053 COMPREHEN METABOLIC PANEL: CPT

## 2025-06-03 PROCEDURE — 1159F MED LIST DOCD IN RCRD: CPT | Mod: CPTII,,, | Performed by: FAMILY MEDICINE

## 2025-06-03 PROCEDURE — 85025 COMPLETE CBC W/AUTO DIFF WBC: CPT

## 2025-06-03 PROCEDURE — 3079F DIAST BP 80-89 MM HG: CPT | Mod: CPTII,,, | Performed by: FAMILY MEDICINE

## 2025-06-03 PROCEDURE — 99396 PREV VISIT EST AGE 40-64: CPT | Mod: S$PBB,,, | Performed by: FAMILY MEDICINE

## 2025-06-03 PROCEDURE — 84153 ASSAY OF PSA TOTAL: CPT

## 2025-06-03 PROCEDURE — 36415 COLL VENOUS BLD VENIPUNCTURE: CPT | Mod: PO

## 2025-06-03 PROCEDURE — 3075F SYST BP GE 130 - 139MM HG: CPT | Mod: CPTII,,, | Performed by: FAMILY MEDICINE

## 2025-06-03 PROCEDURE — 82043 UR ALBUMIN QUANTITATIVE: CPT | Performed by: FAMILY MEDICINE

## 2025-06-03 PROCEDURE — 99213 OFFICE O/P EST LOW 20 MIN: CPT | Mod: PBBFAC,PO | Performed by: FAMILY MEDICINE

## 2025-06-03 PROCEDURE — 99999 PR PBB SHADOW E&M-EST. PATIENT-LVL III: CPT | Mod: PBBFAC,,, | Performed by: FAMILY MEDICINE

## 2025-06-03 PROCEDURE — 3008F BODY MASS INDEX DOCD: CPT | Mod: CPTII,,, | Performed by: FAMILY MEDICINE

## 2025-06-03 PROCEDURE — 80061 LIPID PANEL: CPT

## 2025-06-03 RX ORDER — ATORVASTATIN CALCIUM 10 MG/1
10 TABLET, FILM COATED ORAL NIGHTLY
Qty: 90 TABLET | Refills: 3 | Status: SHIPPED | OUTPATIENT
Start: 2025-06-03

## 2025-06-04 ENCOUNTER — RESULTS FOLLOW-UP (OUTPATIENT)
Dept: FAMILY MEDICINE | Facility: CLINIC | Age: 50
End: 2025-06-04

## 2025-06-12 ENCOUNTER — TELEPHONE (OUTPATIENT)
Dept: FAMILY MEDICINE | Facility: CLINIC | Age: 50
End: 2025-06-12
Payer: MEDICAID

## 2025-06-12 DIAGNOSIS — Z23 NEED FOR VACCINATION: Primary | ICD-10-CM

## 2025-06-12 NOTE — TELEPHONE ENCOUNTER
Pt states Monday 6/16/   PROVIDER:[TOKEN:[[id number]],FOLLOWUP:[2 weeks]] PROVIDER:[TOKEN:[[id number]],FOLLOWUP:[2 weeks]],PROVIDER:[TOKEN:[[id number]],FOLLOWUP:[2 weeks],ESTABLISHEDPATIENT:[T]]

## 2025-06-16 ENCOUNTER — CLINICAL SUPPORT (OUTPATIENT)
Dept: FAMILY MEDICINE | Facility: CLINIC | Age: 50
End: 2025-06-16
Payer: MEDICAID

## 2025-06-16 ENCOUNTER — TELEPHONE (OUTPATIENT)
Dept: FAMILY MEDICINE | Facility: CLINIC | Age: 50
End: 2025-06-16

## 2025-06-16 DIAGNOSIS — Z91.018 MULTIPLE FOOD ALLERGIES: Primary | ICD-10-CM

## 2025-06-16 DIAGNOSIS — Z23 NEED FOR VACCINATION: Primary | ICD-10-CM

## 2025-06-16 PROCEDURE — 90750 HZV VACC RECOMBINANT IM: CPT | Mod: PBBFAC,PO

## 2025-06-16 PROCEDURE — 99999PBSHW PR PBB SHADOW TECHNICAL ONLY FILED TO HB: Mod: PBBFAC,,,

## 2025-06-16 PROCEDURE — 90471 IMMUNIZATION ADMIN: CPT | Mod: PBBFAC,PO

## 2025-06-16 PROCEDURE — 99211 OFF/OP EST MAY X REQ PHY/QHP: CPT | Mod: PBBFAC,PO

## 2025-06-16 PROCEDURE — 99999 PR PBB SHADOW E&M-EST. PATIENT-LVL I: CPT | Mod: PBBFAC,,,

## 2025-06-16 RX ADMIN — ZOSTER VACCINE RECOMBINANT, ADJUVANTED 0.5 ML: KIT at 01:06

## 2025-06-16 NOTE — PROGRESS NOTES
Pt came in for shingles vaccine. Pt vaccine was given in his left arm. Pt allergies and medication reviewed. Pt advised next dose can be given 2-6 months from now.

## 2025-06-16 NOTE — TELEPHONE ENCOUNTER
Pt came in for a nurse visit today. Pt is requesting a referral for allergy specialist. Pt states he has breakouts when eating certain things. Please advise.

## 2025-06-19 ENCOUNTER — HOSPITAL ENCOUNTER (OUTPATIENT)
Dept: RADIOLOGY | Facility: HOSPITAL | Age: 50
Discharge: HOME | End: 2025-06-19
Attending: FAMILY MEDICINE
Payer: MEDICAID

## 2025-06-19 ENCOUNTER — HOSPITAL ENCOUNTER (OUTPATIENT)
Dept: CARDIOLOGY | Facility: HOSPITAL | Age: 50
Discharge: HOME | End: 2025-06-19
Attending: FAMILY MEDICINE
Payer: MEDICAID

## 2025-06-19 DIAGNOSIS — R06.09 EXERTIONAL DYSPNEA: ICD-10-CM

## 2025-06-19 DIAGNOSIS — R53.83 FATIGUE, UNSPECIFIED TYPE: ICD-10-CM

## 2025-06-19 LAB
CV STRESS BASE HR: 58 BPM
DIASTOLIC BLOOD PRESSURE: 79 MMHG
EJECTION FRACTION- HIGH: 73 %
END DIASTOLIC INDEX-HIGH: 165 ML/M2
END DIASTOLIC INDEX-LOW: 101 ML/M2
END SYSTOLIC INDEX-HIGH: 64 ML/M2
END SYSTOLIC INDEX-LOW: 28 ML/M2
NUC REST EJECTION FRACTION: 65
NUC STRESS EJECTION FRACTION: 74 %
OHS CV CPX 85 PERCENT MAX PREDICTED HEART RATE MALE: 144
OHS CV CPX ESTIMATED METS: 10
OHS CV CPX MAX PREDICTED HEART RATE: 169
OHS CV CPX PATIENT IS FEMALE: 0
OHS CV CPX PATIENT IS MALE: 1
OHS CV CPX PEAK DIASTOLIC BLOOD PRESSURE: 111 MMHG
OHS CV CPX PEAK HEAR RATE: 137 BPM
OHS CV CPX PEAK RATE PRESSURE PRODUCT: NORMAL
OHS CV CPX PEAK SYSTOLIC BLOOD PRESSURE: 203 MMHG
OHS CV CPX PERCENT MAX PREDICTED HEART RATE ACHIEVED: 81
OHS CV CPX RATE PRESSURE PRODUCT PRESENTING: 6554
OHS CV INITIAL DOSE: 10 MCG/KG/MIN
OHS CV PEAK DOSE: 30.5 MCG/KG/MIN
RETIRED EF AND QEF - SEE NOTES: 59 %
STRESS ECHO POST EXERCISE DUR MIN: 8 MINUTES
STRESS ECHO POST EXERCISE DUR SEC: 47 SECONDS
SYSTOLIC BLOOD PRESSURE: 113 MMHG

## 2025-06-19 PROCEDURE — A9502 TC99M TETROFOSMIN: HCPCS | Performed by: FAMILY MEDICINE

## 2025-06-19 PROCEDURE — 93017 CV STRESS TEST TRACING ONLY: CPT

## 2025-06-19 PROCEDURE — 78452 HT MUSCLE IMAGE SPECT MULT: CPT

## 2025-06-19 RX ADMIN — TETROFOSMIN 30.5 MILLICURIE: 1.38 INJECTION, POWDER, LYOPHILIZED, FOR SOLUTION INTRAVENOUS at 01:06

## 2025-06-19 RX ADMIN — TETROFOSMIN 10 MILLICURIE: 1.38 INJECTION, POWDER, LYOPHILIZED, FOR SOLUTION INTRAVENOUS at 11:06

## 2025-07-03 ENCOUNTER — PATIENT OUTREACH (OUTPATIENT)
Dept: ADMINISTRATIVE | Facility: HOSPITAL | Age: 50
End: 2025-07-03
Payer: MEDICAID

## 2025-07-03 DIAGNOSIS — F17.210 NICOTINE DEPENDENCE, CIGARETTES, UNCOMPLICATED: ICD-10-CM

## 2025-07-03 DIAGNOSIS — Z12.2 ENCOUNTER FOR SCREENING FOR MALIGNANT NEOPLASM OF RESPIRATORY ORGANS: Primary | ICD-10-CM

## 2025-07-03 NOTE — PROGRESS NOTES
Working LDCT Report: Chart searched, Last PCP Appt was: 06/2025.   Attempted to contact patient to schedule CHEST CT , spoke with pt's wife, placed order per [facility] and scheduled appt.

## 2025-07-15 ENCOUNTER — HOSPITAL ENCOUNTER (INPATIENT)
Facility: HOSPITAL | Age: 50
LOS: 3 days | Discharge: HOME | DRG: 189 | End: 2025-07-18
Attending: EMERGENCY MEDICINE | Admitting: FAMILY MEDICINE
Payer: MEDICAID

## 2025-07-15 DIAGNOSIS — J96.01 SEPSIS WITH ACUTE HYPOXIC RESPIRATORY FAILURE WITHOUT SEPTIC SHOCK, DUE TO UNSPECIFIED ORGANISM: ICD-10-CM

## 2025-07-15 DIAGNOSIS — R06.02 SOB (SHORTNESS OF BREATH): ICD-10-CM

## 2025-07-15 DIAGNOSIS — R65.20 SEPSIS WITH ACUTE HYPOXIC RESPIRATORY FAILURE WITHOUT SEPTIC SHOCK, DUE TO UNSPECIFIED ORGANISM: ICD-10-CM

## 2025-07-15 DIAGNOSIS — E11.9 TYPE 2 DIABETES MELLITUS WITHOUT COMPLICATION, WITHOUT LONG-TERM CURRENT USE OF INSULIN: Chronic | ICD-10-CM

## 2025-07-15 DIAGNOSIS — A41.9 SEPSIS WITH ACUTE HYPOXIC RESPIRATORY FAILURE WITHOUT SEPTIC SHOCK, DUE TO UNSPECIFIED ORGANISM: ICD-10-CM

## 2025-07-15 DIAGNOSIS — R06.02 SHORTNESS OF BREATH: ICD-10-CM

## 2025-07-15 DIAGNOSIS — J44.1 COPD EXACERBATION: ICD-10-CM

## 2025-07-15 DIAGNOSIS — R91.1 PULMONARY NODULE 1 CM OR GREATER IN DIAMETER: ICD-10-CM

## 2025-07-15 DIAGNOSIS — J43.9 PULMONARY EMPHYSEMA, UNSPECIFIED EMPHYSEMA TYPE: ICD-10-CM

## 2025-07-15 DIAGNOSIS — J96.01 ACUTE HYPOXIC RESPIRATORY FAILURE: Primary | ICD-10-CM

## 2025-07-15 LAB
ABSOLUTE EOSINOPHIL (OHS): 0.04 K/UL
ABSOLUTE MONOCYTE (OHS): 0.62 K/UL (ref 0.3–1)
ABSOLUTE NEUTROPHIL COUNT (OHS): 5.07 K/UL (ref 1.8–7.7)
ALBUMIN SERPL BCP-MCNC: 3.5 G/DL (ref 3.5–5.2)
ALLENS TEST: ABNORMAL
ALP SERPL-CCNC: 66 UNIT/L (ref 40–150)
ALT SERPL W/O P-5'-P-CCNC: 40 UNIT/L (ref 10–44)
ANION GAP (OHS): 9 MMOL/L (ref 8–16)
AST SERPL-CCNC: 24 UNIT/L (ref 11–45)
BACTERIA #/AREA URNS AUTO: NORMAL /HPF
BASOPHILS # BLD AUTO: 0.02 K/UL
BASOPHILS NFR BLD AUTO: 0.3 %
BILIRUB SERPL-MCNC: 0.3 MG/DL (ref 0.1–1)
BILIRUB UR QL STRIP.AUTO: NEGATIVE
BNP SERPL-MCNC: <10 PG/ML (ref 0–99)
BUN SERPL-MCNC: 13 MG/DL (ref 6–20)
CALCIUM SERPL-MCNC: 8.7 MG/DL (ref 8.7–10.5)
CHLORIDE SERPL-SCNC: 107 MMOL/L (ref 95–110)
CLARITY UR: CLEAR
CO2 SERPL-SCNC: 21 MMOL/L (ref 23–29)
COLOR UR AUTO: YELLOW
CREAT SERPL-MCNC: 0.6 MG/DL (ref 0.5–1.4)
DELSYS: ABNORMAL
ERYTHROCYTE [DISTWIDTH] IN BLOOD BY AUTOMATED COUNT: 14.1 % (ref 11.5–14.5)
FLOW: 3
GFR SERPLBLD CREATININE-BSD FMLA CKD-EPI: >60 ML/MIN/1.73/M2
GLUCOSE SERPL-MCNC: 103 MG/DL (ref 70–110)
GLUCOSE UR QL STRIP: NEGATIVE
HCO3 UR-SCNC: 25.9 MMOL/L (ref 24–28)
HCT VFR BLD AUTO: 48 % (ref 40–54)
HGB BLD-MCNC: 15.8 GM/DL (ref 14–18)
HGB UR QL STRIP: ABNORMAL
HYALINE CASTS UR QL AUTO: 0 /LPF (ref 0–1)
IMM GRANULOCYTES # BLD AUTO: 0.04 K/UL (ref 0–0.04)
IMM GRANULOCYTES NFR BLD AUTO: 0.6 % (ref 0–0.5)
INFLUENZA A MOLECULAR (OHS): NEGATIVE
INFLUENZA B MOLECULAR (OHS): NEGATIVE
KETONES UR QL STRIP: NEGATIVE
LACTATE SERPL-SCNC: 0.6 MMOL/L (ref 0.5–2.2)
LACTATE SERPL-SCNC: 1 MMOL/L (ref 0.5–2.2)
LEUKOCYTE ESTERASE UR QL STRIP: NEGATIVE
LYMPHOCYTES # BLD AUTO: 1.42 K/UL (ref 1–4.8)
MAGNESIUM SERPL-MCNC: 1.8 MG/DL (ref 1.6–2.6)
MCH RBC QN AUTO: 29 PG (ref 27–31)
MCHC RBC AUTO-ENTMCNC: 32.9 G/DL (ref 32–36)
MCV RBC AUTO: 88 FL (ref 82–98)
MICROSCOPIC COMMENT: NORMAL
MODE: ABNORMAL
NITRITE UR QL STRIP: NEGATIVE
NUCLEATED RBC (/100WBC) (OHS): 0 /100 WBC
OHS QRS DURATION: 84 MS
OHS QTC CALCULATION: 400 MS
PCO2 BLDA: 44.6 MMHG (ref 35–45)
PH SMN: 7.37 [PH] (ref 7.35–7.45)
PH UR STRIP: 6 [PH]
PLATELET # BLD AUTO: 149 K/UL (ref 150–450)
PMV BLD AUTO: 9.6 FL (ref 9.2–12.9)
PO2 BLDA: 110 MMHG (ref 80–100)
POC BE: 1 MMOL/L (ref -2–2)
POC SATURATED O2: 98 % (ref 95–100)
POCT GLUCOSE: 214 MG/DL (ref 70–110)
POTASSIUM SERPL-SCNC: 3.7 MMOL/L (ref 3.5–5.1)
PROCALCITONIN SERPL-MCNC: 0.07 NG/ML
PROT SERPL-MCNC: 7.4 GM/DL (ref 6–8.4)
PROT UR QL STRIP: ABNORMAL
RBC # BLD AUTO: 5.44 M/UL (ref 4.6–6.2)
RBC #/AREA URNS AUTO: 0 /HPF (ref 0–4)
RELATIVE EOSINOPHIL (OHS): 0.6 %
RELATIVE LYMPHOCYTE (OHS): 19.7 % (ref 18–48)
RELATIVE MONOCYTE (OHS): 8.6 % (ref 4–15)
RELATIVE NEUTROPHIL (OHS): 70.2 % (ref 38–73)
SAMPLE: ABNORMAL
SARS-COV-2 RDRP RESP QL NAA+PROBE: NEGATIVE
SITE: ABNORMAL
SODIUM SERPL-SCNC: 137 MMOL/L (ref 136–145)
SP GR UR STRIP: 1.02
TROPONIN I SERPL DL<=0.01 NG/ML-MCNC: <0.006 NG/ML
UROBILINOGEN UR STRIP-ACNC: NEGATIVE EU/DL
WBC # BLD AUTO: 7.21 K/UL (ref 3.9–12.7)
WBC #/AREA URNS AUTO: 0 /HPF (ref 0–5)

## 2025-07-15 PROCEDURE — 80053 COMPREHEN METABOLIC PANEL: CPT | Performed by: EMERGENCY MEDICINE

## 2025-07-15 PROCEDURE — 25000003 PHARM REV CODE 250: Performed by: FAMILY MEDICINE

## 2025-07-15 PROCEDURE — 94640 AIRWAY INHALATION TREATMENT: CPT | Mod: XB

## 2025-07-15 PROCEDURE — 36415 COLL VENOUS BLD VENIPUNCTURE: CPT | Performed by: EMERGENCY MEDICINE

## 2025-07-15 PROCEDURE — 63700000 PHARM REV CODE 250 ALT 637 W/O HCPCS: Performed by: FAMILY MEDICINE

## 2025-07-15 PROCEDURE — 36600 WITHDRAWAL OF ARTERIAL BLOOD: CPT

## 2025-07-15 PROCEDURE — 84145 PROCALCITONIN (PCT): CPT | Performed by: EMERGENCY MEDICINE

## 2025-07-15 PROCEDURE — 21400001 HC TELEMETRY ROOM

## 2025-07-15 PROCEDURE — 87040 BLOOD CULTURE FOR BACTERIA: CPT | Mod: 91 | Performed by: EMERGENCY MEDICINE

## 2025-07-15 PROCEDURE — 96375 TX/PRO/DX INJ NEW DRUG ADDON: CPT

## 2025-07-15 PROCEDURE — G0378 HOSPITAL OBSERVATION PER HR: HCPCS

## 2025-07-15 PROCEDURE — 93010 ELECTROCARDIOGRAM REPORT: CPT | Mod: ,,, | Performed by: INTERNAL MEDICINE

## 2025-07-15 PROCEDURE — 85025 COMPLETE CBC W/AUTO DIFF WBC: CPT | Performed by: EMERGENCY MEDICINE

## 2025-07-15 PROCEDURE — 96365 THER/PROPH/DIAG IV INF INIT: CPT

## 2025-07-15 PROCEDURE — 63600175 PHARM REV CODE 636 W HCPCS: Performed by: EMERGENCY MEDICINE

## 2025-07-15 PROCEDURE — 27000221 HC OXYGEN, UP TO 24 HOURS

## 2025-07-15 PROCEDURE — U0002 COVID-19 LAB TEST NON-CDC: HCPCS | Performed by: EMERGENCY MEDICINE

## 2025-07-15 PROCEDURE — 63600175 PHARM REV CODE 636 W HCPCS: Mod: JZ,TB | Performed by: FAMILY MEDICINE

## 2025-07-15 PROCEDURE — 83735 ASSAY OF MAGNESIUM: CPT | Performed by: EMERGENCY MEDICINE

## 2025-07-15 PROCEDURE — 25000242 PHARM REV CODE 250 ALT 637 W/ HCPCS: Performed by: FAMILY MEDICINE

## 2025-07-15 PROCEDURE — 83880 ASSAY OF NATRIURETIC PEPTIDE: CPT | Performed by: EMERGENCY MEDICINE

## 2025-07-15 PROCEDURE — 84484 ASSAY OF TROPONIN QUANT: CPT | Performed by: EMERGENCY MEDICINE

## 2025-07-15 PROCEDURE — 81001 URINALYSIS AUTO W/SCOPE: CPT | Performed by: EMERGENCY MEDICINE

## 2025-07-15 PROCEDURE — 83605 ASSAY OF LACTIC ACID: CPT | Performed by: EMERGENCY MEDICINE

## 2025-07-15 PROCEDURE — 94761 N-INVAS EAR/PLS OXIMETRY MLT: CPT | Mod: XB

## 2025-07-15 PROCEDURE — 82803 BLOOD GASES ANY COMBINATION: CPT

## 2025-07-15 PROCEDURE — 99291 CRITICAL CARE FIRST HOUR: CPT

## 2025-07-15 PROCEDURE — 87502 INFLUENZA DNA AMP PROBE: CPT | Performed by: EMERGENCY MEDICINE

## 2025-07-15 PROCEDURE — 25000242 PHARM REV CODE 250 ALT 637 W/ HCPCS: Performed by: EMERGENCY MEDICINE

## 2025-07-15 PROCEDURE — 99900035 HC TECH TIME PER 15 MIN (STAT)

## 2025-07-15 PROCEDURE — 93005 ELECTROCARDIOGRAM TRACING: CPT

## 2025-07-15 PROCEDURE — 25000003 PHARM REV CODE 250: Performed by: EMERGENCY MEDICINE

## 2025-07-15 RX ORDER — IPRATROPIUM BROMIDE AND ALBUTEROL SULFATE 2.5; .5 MG/3ML; MG/3ML
3 SOLUTION RESPIRATORY (INHALATION)
Status: COMPLETED | OUTPATIENT
Start: 2025-07-15 | End: 2025-07-15

## 2025-07-15 RX ORDER — HYDROCODONE BITARTRATE AND ACETAMINOPHEN 5; 325 MG/1; MG/1
1 TABLET ORAL EVERY 4 HOURS PRN
Refills: 0 | Status: DISCONTINUED | OUTPATIENT
Start: 2025-07-15 | End: 2025-07-18 | Stop reason: HOSPADM

## 2025-07-15 RX ORDER — ATORVASTATIN CALCIUM 10 MG/1
10 TABLET, FILM COATED ORAL NIGHTLY
Status: DISCONTINUED | OUTPATIENT
Start: 2025-07-15 | End: 2025-07-18 | Stop reason: HOSPADM

## 2025-07-15 RX ORDER — GLUCAGON 1 MG
1 KIT INJECTION
Status: DISCONTINUED | OUTPATIENT
Start: 2025-07-15 | End: 2025-07-18 | Stop reason: HOSPADM

## 2025-07-15 RX ORDER — BUDESONIDE 0.5 MG/2ML
0.5 INHALANT ORAL EVERY 12 HOURS
Status: DISCONTINUED | OUTPATIENT
Start: 2025-07-15 | End: 2025-07-18 | Stop reason: HOSPADM

## 2025-07-15 RX ORDER — FLUTICASONE FUROATE, UMECLIDINIUM BROMIDE AND VILANTEROL TRIFENATATE 200; 62.5; 25 UG/1; UG/1; UG/1
1 POWDER RESPIRATORY (INHALATION) DAILY
COMMUNITY
Start: 2025-07-02 | End: 2025-09-30

## 2025-07-15 RX ORDER — IBUPROFEN 200 MG
24 TABLET ORAL
Status: DISCONTINUED | OUTPATIENT
Start: 2025-07-15 | End: 2025-07-18 | Stop reason: HOSPADM

## 2025-07-15 RX ORDER — ARFORMOTEROL TARTRATE 15 UG/2ML
15 SOLUTION RESPIRATORY (INHALATION) 2 TIMES DAILY
Status: DISCONTINUED | OUTPATIENT
Start: 2025-07-15 | End: 2025-07-18 | Stop reason: HOSPADM

## 2025-07-15 RX ORDER — ACETAMINOPHEN 325 MG/1
650 TABLET ORAL EVERY 4 HOURS PRN
Status: DISCONTINUED | OUTPATIENT
Start: 2025-07-15 | End: 2025-07-18 | Stop reason: HOSPADM

## 2025-07-15 RX ORDER — CEFTRIAXONE 1 G/1
1 INJECTION, POWDER, FOR SOLUTION INTRAMUSCULAR; INTRAVENOUS
Status: DISCONTINUED | OUTPATIENT
Start: 2025-07-16 | End: 2025-07-18 | Stop reason: HOSPADM

## 2025-07-15 RX ORDER — ACETAMINOPHEN 325 MG/1
650 TABLET ORAL EVERY 8 HOURS PRN
Status: DISCONTINUED | OUTPATIENT
Start: 2025-07-15 | End: 2025-07-18 | Stop reason: HOSPADM

## 2025-07-15 RX ORDER — IPRATROPIUM BROMIDE AND ALBUTEROL SULFATE 2.5; .5 MG/3ML; MG/3ML
3 SOLUTION RESPIRATORY (INHALATION)
Status: DISCONTINUED | OUTPATIENT
Start: 2025-07-15 | End: 2025-07-16

## 2025-07-15 RX ORDER — CEFTRIAXONE 1 G/1
1 INJECTION, POWDER, FOR SOLUTION INTRAMUSCULAR; INTRAVENOUS
Status: COMPLETED | OUTPATIENT
Start: 2025-07-15 | End: 2025-07-15

## 2025-07-15 RX ORDER — ACETAMINOPHEN 325 MG/1
650 TABLET ORAL
Status: DISPENSED | OUTPATIENT
Start: 2025-07-15 | End: 2025-07-16

## 2025-07-15 RX ORDER — INSULIN ASPART 100 [IU]/ML
0-10 INJECTION, SOLUTION INTRAVENOUS; SUBCUTANEOUS
Status: DISCONTINUED | OUTPATIENT
Start: 2025-07-15 | End: 2025-07-18 | Stop reason: HOSPADM

## 2025-07-15 RX ORDER — SODIUM CHLORIDE 0.9 % (FLUSH) 0.9 %
3 SYRINGE (ML) INJECTION
Status: DISCONTINUED | OUTPATIENT
Start: 2025-07-15 | End: 2025-07-18 | Stop reason: HOSPADM

## 2025-07-15 RX ORDER — METHYLPREDNISOLONE SOD SUCC 125 MG
125 VIAL (EA) INJECTION
Status: COMPLETED | OUTPATIENT
Start: 2025-07-15 | End: 2025-07-15

## 2025-07-15 RX ORDER — CEFTRIAXONE 1 G/1
1 INJECTION, POWDER, FOR SOLUTION INTRAMUSCULAR; INTRAVENOUS
Status: DISCONTINUED | OUTPATIENT
Start: 2025-07-15 | End: 2025-07-15

## 2025-07-15 RX ORDER — AZITHROMYCIN 250 MG/1
250 TABLET, FILM COATED ORAL EVERY 24 HOURS
Status: DISCONTINUED | OUTPATIENT
Start: 2025-07-16 | End: 2025-07-18 | Stop reason: HOSPADM

## 2025-07-15 RX ORDER — IBUPROFEN 200 MG
16 TABLET ORAL
Status: DISCONTINUED | OUTPATIENT
Start: 2025-07-15 | End: 2025-07-18 | Stop reason: HOSPADM

## 2025-07-15 RX ORDER — AZITHROMYCIN 250 MG/1
500 TABLET, FILM COATED ORAL ONCE
Status: COMPLETED | OUTPATIENT
Start: 2025-07-15 | End: 2025-07-15

## 2025-07-15 RX ORDER — SODIUM CHLORIDE 0.9 % (FLUSH) 0.9 %
10 SYRINGE (ML) INJECTION
Status: DISCONTINUED | OUTPATIENT
Start: 2025-07-15 | End: 2025-07-18 | Stop reason: HOSPADM

## 2025-07-15 RX ORDER — IBUPROFEN 200 MG
1 TABLET ORAL DAILY
Status: DISCONTINUED | OUTPATIENT
Start: 2025-07-16 | End: 2025-07-18 | Stop reason: HOSPADM

## 2025-07-15 RX ORDER — AZITHROMYCIN 250 MG/1
500 TABLET, FILM COATED ORAL EVERY 24 HOURS
Status: DISCONTINUED | OUTPATIENT
Start: 2025-07-16 | End: 2025-07-15

## 2025-07-15 RX ADMIN — METHYLPREDNISOLONE SODIUM SUCCINATE 125 MG: 125 INJECTION, POWDER, FOR SOLUTION INTRAMUSCULAR; INTRAVENOUS at 02:07

## 2025-07-15 RX ADMIN — BUDESONIDE INHALATION 0.5 MG: 0.5 SUSPENSION RESPIRATORY (INHALATION) at 07:07

## 2025-07-15 RX ADMIN — CEFTRIAXONE 1 G: 1 INJECTION, POWDER, FOR SOLUTION INTRAMUSCULAR; INTRAVENOUS at 03:07

## 2025-07-15 RX ADMIN — AZITHROMYCIN DIHYDRATE 500 MG: 250 TABLET ORAL at 06:07

## 2025-07-15 RX ADMIN — IPRATROPIUM BROMIDE AND ALBUTEROL SULFATE 3 ML: 2.5; .5 SOLUTION RESPIRATORY (INHALATION) at 02:07

## 2025-07-15 RX ADMIN — IPRATROPIUM BROMIDE AND ALBUTEROL SULFATE 3 ML: 2.5; .5 SOLUTION RESPIRATORY (INHALATION) at 07:07

## 2025-07-15 RX ADMIN — ARFORMOTEROL TARTRATE 15 MCG: 15 SOLUTION RESPIRATORY (INHALATION) at 07:07

## 2025-07-15 RX ADMIN — ATORVASTATIN CALCIUM 10 MG: 10 TABLET, FILM COATED ORAL at 08:07

## 2025-07-15 RX ADMIN — DOXYCYCLINE 100 MG: 100 INJECTION, POWDER, LYOPHILIZED, FOR SOLUTION INTRAVENOUS at 03:07

## 2025-07-15 RX ADMIN — METHYLPREDNISOLONE SODIUM SUCCINATE 40 MG: 40 INJECTION, POWDER, FOR SOLUTION INTRAMUSCULAR; INTRAVENOUS at 10:07

## 2025-07-15 NOTE — PHARMACY MED REC
"Admission Medication History     The home medication history was taken by Genevieve Walker.    You may go to "Admission" then "Reconcile Home Medications" tabs to review and/or act upon these items.     The home medication list has been updated by the Pharmacy department.   Please read ALL comments highlighted in yellow.   Please address this information as you see fit.    Feel free to contact us if you have any questions or require assistance.      The medications listed below were removed from the home medication list. Please reorder if appropriate:  Patient reports no longer taking the following medication(s):  Mobic 15 mg        Medications listed below were obtained from: Patient/family and Analytic software- [employer]      [facility] COMPLETED:       Genevieve Walker  [id number]    Current Outpatient Medications on File Prior to Encounter   Medication Sig Dispense Refill Last Dose/Taking    atorvastatin (LIPITOR) 10 MG tablet Take 1 tablet (10 mg total) by mouth every evening. 90 tablet 3 7/14/2025 Evening    TRELEGY ELLIPTA 200-62.5-25 mcg inhaler Inhale 1 puff into the lungs once daily.   7/15/2025    albuterol (PROVENTIL/VENTOLIN HFA) 90 mcg/actuation inhaler Inhale 1-2 puffs into the lungs every 4 (four) hours as needed for Wheezing. Rescue 8 g 0 Unknown                           .          "

## 2025-07-15 NOTE — ASSESSMENT & PLAN NOTE
Patient with Hypoxic Respiratory failure which is Acute on chronic.  he is not on home oxygen. Supplemental oxygen was provided and noted-      .   Signs/symptoms of respiratory failure include- tachypnea and increased work of breathing. Contributing diagnoses includes - COPD Labs and images were reviewed. Patient Has not had a recent ABG, but ordered. Will treat underlying causes and adjust management of respiratory failure as follows- steroids, DuoNebs, Trelegy, Pulmonary consult    -new diagnosis of COPD.  CT chest shows emphysema and pulmonary nodule.  -echocardiogram was considered but patient had a stress test 1 month ago that showed an EF of 65% and BNP <10

## 2025-07-15 NOTE — HOSPITAL COURSE
7/16/2025: Patient on 1L NC. Looks comfortable upon entering room but gets mildly tachypnea when awakening. Complains of productive cough and worsening sob with coughing fits/lying flat.     7/17/2025: Back and forth from 1 to 3L throughout day although sats >92%. Still complaining of sob with movement, getting oob. Productive cough. Sputum culture collected. Repeat echo wnl.     7/18/2025: Looks better this morning. No wheezing on exam. Sats 98% on 3L.

## 2025-07-15 NOTE — PROGRESS NOTES
Respiratory Therapeutic Interchange per Pharmacy      Vu Ray Hathaway is a 50 y.o. year old male being treated with a fluticasone-umeclidinium-vilanterol (Trelegy) inhaler Daily.      Per [facility] Respiratory Therapeutic Interchange process, fluticasone-umeclidinium-vilanterol (Trelegy) has been converted to the following nebulized medications: budesonide twice daily, arformoterol twice daily, and ipratropium every 6 hours while awake.         Thank you,   Saleem Moreno, Pharm.D. 7/15/2025 5:46 PM

## 2025-07-15 NOTE — CONSULTS
[facility] - Telemetry ([facility])  Pulmonology  Consult Note    Patient Name: Yazan Hathaway  MRN: [id number]  Admission Date: 7/15/2025  Hospital Length of Stay: 0 days  Code Status: Full Code  Attending Physician: David Pike MD  Primary Care Provider: Simi Johnson MD   Principal Problem: Acute hypoxic respiratory failure    Consults  Subjective:     HPI:  No notes on file    Past Medical History:   Diagnosis Date    Asthma     Chronic knee pain     Hyperlipidemia     Lower leg fracture     Right, as a child       Past Surgical History:   Procedure Laterality Date    COLONOSCOPY N/A 9/19/2024    Procedure: COLONOSCOPY;  Surgeon: Melania Muñiz MD;  Location: [facility];  Service: Gastroenterology;  Laterality: N/A;    NONE         Review of patient's allergies indicates:   Allergen Reactions    Beef containing products     Shellfish containing products        Family History       Problem Relation (Age of Onset)    Allergies Son    Diabetes Mother, Brother    Hypertension Brother          Tobacco Use    Smoking status: Every Day     Current packs/day: 1.00     Average packs/day: 1 pack/day for 30.0 years (30.0 ttl pk-yrs)     Types: Cigarettes    Smokeless tobacco: Never   Substance and Sexual Activity    Alcohol use: Not Currently     Comment: Monthly or less    Drug use: Never    Sexual activity: Yes     Partners: Female         Review of Systems   All other systems reviewed and are negative.    Objective:     Vital Signs (Most Recent):  Temp: 99.9 °F (37.7 °C) (07/15/25 1646)  Pulse: 86 (07/15/25 1646)  Resp: (!) 24 (07/15/25 1646)  BP: 113/66 (07/15/25 1646)  SpO2: 96 % (07/15/25 1646) Vital Signs (24h Range):  Temp:  [99.4 °F (37.4 °C)-100.7 °F (38.2 °C)] 99.9 °F (37.7 °C)  Pulse:  [83-97] 86  Resp:  [18-24] 24  SpO2:  [90 %-99 %] 96 %  BP: (113-144)/(66-90) 113/66     Weight: 66.3 kg (146 lb 2.6 oz)  Body mass index is 28.55 kg/m².    No intake or output data in the 24 hours ending 07/15/25 1814    "  Physical Exam  Vitals and nursing note reviewed.   Constitutional:       General: He is in acute distress.      Appearance: He is ill-appearing. He is not toxic-appearing or diaphoretic.   HENT:      Head: Normocephalic.   Eyes:      Pupils: Pupils are equal, round, and reactive to light.   Cardiovascular:      Pulses: Normal pulses.   Pulmonary:      Breath sounds: Wheezing present.   Abdominal:      Palpations: Abdomen is soft.   Musculoskeletal:         General: Normal range of motion.   Skin:     General: Skin is warm.   Neurological:      General: No focal deficit present.      Mental Status: He is alert.          Vents:       Lines/Drains/Airways       Peripheral Intravenous Line  Duration             Peripheral IV Single Lumen 07/15/25 1438 20 G Anterior;Left Forearm <1 day    Peripheral IV Single Lumen 07/15/25 1440 20 G 1 in Anterior;Right Forearm <1 day                    Significant Labs:    CBC/Anemia Profile:  Recent Labs   Lab 07/15/25  1351   WBC 7.21   HGB 15.8   HCT 48.0   *   MCV 88   RDW 14.1        Chemistries:  Recent Labs   Lab 07/15/25  1351      K 3.7      CO2 21*   BUN 13   CREATININE 0.6   CALCIUM 8.7   ALBUMIN 3.5   PROT 7.4   BILITOT 0.3   ALKPHOS 66   ALT 40   AST 24   MG 1.8       All pertinent labs within the past 24 hours have been reviewed.    Significant Imaging:   I have reviewed all pertinent imaging results/findings within the past 24 hours.    ABG  No results for input(s): "PH", "PO2", "PCO2", "HCO3", "BE" in the last 168 hours.  Assessment/Plan:     Pulmonary  * Acute hypoxic respiratory failure  Patient with Hypoxic Respiratory failure which is Acute.  he is not on home oxygen. Supplemental oxygen was provided and noted-      .   Signs/symptoms of respiratory failure include- tachypnea, increased work of breathing, respiratory distress, and use of accessory muscles. Contributing diagnoses includes - COPD Labs and images were reviewed. Patient Has not had a " recent ABG. Will treat underlying causes and adjust management of respiratory failure as follows-   Agree with hospitalist orders    Pulmonary nodule 1 cm or greater in diameter  In an acute exacerbation and not a candidate for intervention at this time.  Follow up in clinic for further testing and plan of action.     Endocrine  Type 2 diabetes mellitus without complication, without long-term current use of insulin  Per Hospitalist    Other  Tobacco use disorder  Counseling done          Thank you for your consult. I will follow-up with patient. Please contact us if you have any additional questions.     Seth Spence MD  Pulmonology  [facility] - Telemetry ([facility])

## 2025-07-15 NOTE — ASSESSMENT & PLAN NOTE
Dr. Collins recommends: Umbilical vein abnormality (GDM, hx of preeclampsia) microcephaly re-referral this pregnancy  35w3d      CMV and Toxoplasmosis labs today-toxo negative.  CMV avidity pending.  - Cell Free Fetal DNA-negative     Dr Collins will manage blood sugars  Check blood sugars 4 times daily: Fasting and 1 hour after meals. Goal is for fasting to be less than 95 and 1 hour after meals to be less than 140. Report blood sugars each week. Forgot blood sugars 6/18/20.  Instructed on how to send in via [facility] this week and reminded to bring them in weekly to appointments    Diet controlled     Fasting Glucose test at 6 week postpartum check to confirm gestational diabetes is no longer an issue     Growth ultrasounds every 3-4 weeks (7-2-20)  BPP ultrasounds weekly     Fetal Movement Counting (\"Kick Counts\")  How to count your baby's movements:  -Try to do your kick counts at about the same time each day during your baby's active time.  -Note the time. Count your baby's movements until you feel ten movements. Note the time again.   -If you felt ten movements in less than two hours, it is reassuring and you may resume your normal activities for the day and count again tomorrow.  -Always remain aware of your baby's movements.  -If it took longer than two hours to count ten movements, call your doctor right away for recommendations.                 Continue routine pregnancy care with the midwives   Patient with Hypoxic Respiratory failure which is Acute.  he is not on home oxygen. Supplemental oxygen was provided and noted-      .   Signs/symptoms of respiratory failure include- tachypnea, increased work of breathing, respiratory distress, and use of accessory muscles. Contributing diagnoses includes - COPD Labs and images were reviewed. Patient Has not had a recent ABG. Will treat underlying causes and adjust management of respiratory failure as follows-   Agree with hospitalist orders

## 2025-07-15 NOTE — ED PROVIDER NOTES
SCRIBE #1 NOTE: I, Lien Talley, am scribing for, and in the presence of, Clara Talley DO. I have scribed the entire note.       History     Chief Complaint   Patient presents with    Shortness of Breath     PT reports SOB x 6 months. + smoker. Hx of asthma. Pt denies CP. Pt reports he has to sleep with two pillows and cannot lay flat.      Review of patient's allergies indicates:   Allergen Reactions    Beef containing products     Shellfish containing products          History of Present Illness     HPI    7/15/2025, 1:25 PM  History obtained from the patient and medical records      History of Present Illness: Yazan Hathaway is a 50 y.o. male patient with a PMHx of HLD, asthma, and hypercholesterolemia who presents to the Emergency Department for evaluation of SOB which began 2 days ago. Per pt's daughter pt had a fever this morning. Pt reports that his SOB is exacerbated with exertion and laying flat.  Associated sxs include productive cough with white sputum. Patient denies any leg swelling, nausea, vomiting, diarrhea, or abdominal pain. No prior Tx specified.  No further complaints or concerns at this time.       Arrival mode: Personal Transportation    PCP: Simi Johnson MD        Past Medical History:  Past Medical History:   Diagnosis Date    Asthma     Chronic knee pain     Hyperlipidemia     Lower leg fracture     Right, as a child       Past Surgical History:  Past Surgical History:   Procedure Laterality Date    COLONOSCOPY N/A 9/19/2024    Procedure: COLONOSCOPY;  Surgeon: Melania Muñiz MD;  Location: [facility];  Service: Gastroenterology;  Laterality: N/A;    NONE           Family History:  Family History   Problem Relation Name Age of Onset    Diabetes Mother      Hypertension Brother      Diabetes Brother      Allergies Son         Social History:  Social History     Tobacco Use    Smoking status: Every Day     Current packs/day: 1.00     Average packs/day: 1 pack/day for 30.0  years (30.0 ttl pk-yrs)     Types: Cigarettes    Smokeless tobacco: Never   Substance and Sexual Activity    Alcohol use: Not Currently     Comment: Monthly or less    Drug use: Never    Sexual activity: Yes     Partners: Female        Review of Systems     Review of Systems   Constitutional:  Positive for fever.   Respiratory:  Positive for cough (productive) and shortness of breath.    Cardiovascular:  Negative for chest pain and leg swelling.   Gastrointestinal:  Negative for abdominal pain, diarrhea, nausea and vomiting.      Physical Exam     Initial Vitals [07/15/25 1308]   BP Pulse Resp Temp SpO2   (!) 144/90 91 20 (!) 100.7 °F (38.2 °C) (!) 90 %      MAP       --          Physical Exam  Nursing Notes and Vital Signs Reviewed.  Constitutional: Patient is in mild distress. Well-developed and well-nourished.  Head: Atraumatic. Normocephalic.  Eyes: PERRL. EOM intact.   ENT: Mucous membranes are moist.   Neck: Supple. Full ROM. No JVD.  Cardiovascular: Regular rate. Regular rhythm. No murmurs, rubs, or gallops. Pulmonary/Chest:  Lung sounds diminished. Expiratory wheeze.  Abdominal: Soft and non-distended. There is no tenderness.  No rebound, guarding, or rigidity.  Musculoskeletal: No edema or calf tenderness.    Skin: Warm and dry.  Neurological:  Alert, awake, and appropriate.  Normal speech.  No acute focal neurological deficits are appreciated.  Psychiatric: Normal affect. Good eye contact. Appropriate in content.     ED Course   Critical Care    Date/Time: 7/15/2025 3:34 PM    Performed by: Clara Talley DO  Authorized by: Clara Talley DO  Direct patient critical care time: 12 minutes  Additional history critical care time: 10 minutes  Ordering / reviewing critical care time: 10 minutes  Documentation critical care time: 10 minutes  Consulting other physicians critical care time: 5 minutes  Total critical care time (exclusive of procedural time) : 47 minutes  Critical care time was exclusive of  separately billable procedures and treating other patients and teaching time.  Critical care was necessary to treat or prevent imminent or life-threatening deterioration of the following conditions: respiratory failure.  Critical care was time spent personally by me on the following activities: development of treatment plan with patient or surrogate, discussions with consultants, evaluation of patient's response to treatment, examination of patient, re-evaluation of patient's condition, pulse oximetry, ordering and review of radiographic studies, ordering and review of laboratory studies, ordering and performing treatments and interventions and obtaining history from patient or surrogate.        ED Vital Signs:  Vitals:    07/15/25 1553 07/15/25 1556 07/15/25 1635 07/15/25 1646   BP:  133/66  113/66   Pulse: 95 97 91 86   Resp: (!) 24   (!) 24   Temp:    99.9 °F (37.7 °C)   TempSrc:    Oral   SpO2: 95% (!) 94%  96%   Weight:       Height:        07/15/25 1700 07/15/25 1950 07/15/25 2000 07/15/25 2005   BP:    117/66   Pulse: 91 88 82 79   Resp:  20  18   Temp:    97.5 °F (36.4 °C)   TempSrc:    Axillary   SpO2:  97%  98%   Weight:       Height:        07/15/25 2341 07/16/25 0400 07/16/25 0428 07/16/25 0702   BP: 108/68  109/72    Pulse: 72 66 72 65   Resp: 18  18    Temp: 98.1 °F (36.7 °C)  98.2 °F (36.8 °C)    TempSrc: Oral  Oral    SpO2: (!) 93%  95%    Weight: 65.9 kg (145 lb 4.5 oz)      Height:        07/16/25 0733 07/16/25 0738 07/16/25 0750   BP:   129/78   Pulse: 71 71 69   Resp: 18  18   Temp:   98.6 °F (37 °C)   TempSrc:   Oral   SpO2: 95%  97%   Weight:      Height:          Abnormal Lab Results:  Labs Reviewed   COMPREHENSIVE METABOLIC PANEL - Abnormal       Result Value    Sodium 137      Potassium 3.7      Chloride 107      CO2 21 (*)     Glucose 103      BUN 13      Creatinine 0.6      Calcium 8.7      Protein Total 7.4      Albumin 3.5      Bilirubin Total 0.3      ALP 66      AST 24      ALT 40       Anion Gap 9      eGFR >60     URINALYSIS, REFLEX TO URINE CULTURE - Abnormal    Color, UA Yellow      Appearance, UA Clear      pH, UA 6.0      Spec Grav UA 1.020      Protein, UA Trace (*)     Glucose, UA Negative      Ketones, UA Negative      Bilirubin, UA Negative      Blood, UA 1+ (*)     Nitrites, UA Negative      Urobilinogen, UA Negative      Leukocyte Esterase, UA Negative     CBC WITH DIFFERENTIAL - Abnormal    WBC 7.21      RBC 5.44      HGB 15.8      HCT 48.0      MCV 88      MCH 29.0      MCHC 32.9      RDW 14.1      Platelet Count 149 (*)     MPV 9.6      Nucleated RBC 0      Neut % 70.2      Lymph % 19.7      Mono % 8.6      Eos % 0.6      Basophil % 0.3      Imm Grans % 0.6 (*)     Neut # 5.07      Lymph # 1.42      Mono # 0.62      Eos # 0.04      Baso # 0.02      Imm Grans # 0.04     INFLUENZA A & B BY MOLECULAR - Normal    INFLUENZA A MOLECULAR Negative      INFLUENZA B MOLECULAR  Negative     MAGNESIUM - Normal    Magnesium  1.8     B-TYPE NATRIURETIC PEPTIDE - Normal    BNP <10     TROPONIN I - Normal    Troponin-I <0.006     PROCALCITONIN - Normal    Procalcitonin 0.07     SARS-COV-2 RNA AMPLIFICATION, QUAL - Normal    SARS COV-2 Molecular Negative     LACTIC ACID, PLASMA - Normal    Lactic Acid Level 0.6      Narrative:     Falsely low lactic acid results can be found in samples containing >=13.0 mg/dL total bilirubin and/or >=3.5 mg/dL direct bilirubin.    CBC W/ AUTO DIFFERENTIAL    Narrative:     The following orders were created for panel order CBC auto differential.  Procedure                               Abnormality         Status                     ---------                               -----------         ------                     CBC with Differential[[id number]]       Abnormal            Final result                 Please view results for these tests on the individual orders.   URINALYSIS MICROSCOPIC    RBC, UA 0      WBC, UA 0      Bacteria, UA Rare      Hyaline Casts, UA 0       Microscopic Comment       GREY TOP URINE HOLD        All Lab Results:  Results for orders placed or performed during the hospital encounter of 07/15/25   EKG 12-lead    Collection Time: 07/15/25  1:10 PM   Result Value Ref Range    QRS Duration 84 ms    [facility] QTC Calculation 400 ms   Influenza A & B by Molecular    Collection Time: 07/15/25  1:46 PM    Specimen: Nasopharyngeal Swab   Result Value Ref Range    INFLUENZA A MOLECULAR Negative Negative    INFLUENZA B MOLECULAR  Negative Negative   COVID-19 Rapid Screening    Collection Time: 07/15/25  1:46 PM   Result Value Ref Range    SARS COV-2 Molecular Negative Negative   Comprehensive metabolic panel    Collection Time: 07/15/25  1:51 PM   Result Value Ref Range    Sodium 137 136 - 145 mmol/L    Potassium 3.7 3.5 - 5.1 mmol/L    Chloride 107 95 - 110 mmol/L    CO2 21 (L) 23 - 29 mmol/L    Glucose 103 70 - 110 mg/dL    BUN 13 6 - 20 mg/dL    Creatinine 0.6 0.5 - 1.4 mg/dL    Calcium 8.7 8.7 - 10.5 mg/dL    Protein Total 7.4 6.0 - 8.4 gm/dL    Albumin 3.5 3.5 - 5.2 g/dL    Bilirubin Total 0.3 0.1 - 1.0 mg/dL    ALP 66 40 - 150 unit/L    AST 24 11 - 45 unit/L    ALT 40 10 - 44 unit/L    Anion Gap 9 8 - 16 mmol/L    eGFR >60 >60 mL/min/1.73/m2   Magnesium    Collection Time: 07/15/25  1:51 PM   Result Value Ref Range    Magnesium  1.8 1.6 - 2.6 mg/dL   Brain natriuretic peptide    Collection Time: 07/15/25  1:51 PM   Result Value Ref Range    BNP <10 0 - 99 pg/mL   Troponin I    Collection Time: 07/15/25  1:51 PM   Result Value Ref Range    Troponin-I <0.006 <=0.026 ng/mL   Procalcitonin    Collection Time: 07/15/25  1:51 PM   Result Value Ref Range    Procalcitonin 0.07 <0.25 ng/mL   CBC with Differential    Collection Time: 07/15/25  1:51 PM   Result Value Ref Range    WBC 7.21 3.90 - 12.70 K/uL    RBC 5.44 4.60 - 6.20 M/uL    HGB 15.8 14.0 - 18.0 gm/dL    HCT 48.0 40.0 - 54.0 %    MCV 88 82 - 98 fL    MCH 29.0 27.0 - 31.0 pg    MCHC 32.9 32.0 - 36.0 g/dL    RDW  14.1 11.5 - 14.5 %    Platelet Count 149 (L) 150 - 450 K/uL    MPV 9.6 9.2 - 12.9 fL    Nucleated RBC 0 <=0 /100 WBC    Neut % 70.2 38 - 73 %    Lymph % 19.7 18 - 48 %    Mono % 8.6 4 - 15 %    Eos % 0.6 <=8 %    Basophil % 0.3 <=1.9 %    Imm Grans % 0.6 (H) 0.0 - 0.5 %    Neut # 5.07 1.8 - 7.7 K/uL    Lymph # 1.42 1 - 4.8 K/uL    Mono # 0.62 0.3 - 1 K/uL    Eos # 0.04 <=0.5 K/uL    Baso # 0.02 <=0.2 K/uL    Imm Grans # 0.04 0.00 - 0.04 K/uL   Lactic acid, plasma    Collection Time: 07/15/25  2:31 PM   Result Value Ref Range    Lactic Acid Level 0.6 0.5 - 2.2 mmol/L   Blood culture x two cultures. Draw prior to antibiotics.    Collection Time: 07/15/25  2:39 PM    Specimen: Peripheral, Forearm, Right; Blood   Result Value Ref Range    Blood Culture No Growth After 6 Hours    Blood culture x two cultures. Draw prior to antibiotics.    Collection Time: 07/15/25  2:39 PM    Specimen: Peripheral, Forearm, Left; Blood   Result Value Ref Range    Blood Culture No Growth After 6 Hours    Urinalysis, Reflex to Urine Culture Urine, Clean Catch    Collection Time: 07/15/25  3:48 PM    Specimen: Urine, Clean Catch   Result Value Ref Range    Color, UA Yellow Straw, Mya, Yellow, Light-Orange    Appearance, UA Clear Clear    pH, UA 6.0 5.0 - 8.0    Spec Grav UA 1.020 1.005 - 1.030    Protein, UA Trace (A) Negative    Glucose, UA Negative Negative    Ketones, UA Negative Negative    Bilirubin, UA Negative Negative    Blood, UA 1+ (A) Negative    Nitrites, UA Negative Negative    Urobilinogen, UA Negative <2.0 EU/dL    Leukocyte Esterase, UA Negative Negative   Urinalysis Microscopic    Collection Time: 07/15/25  3:48 PM   Result Value Ref Range    RBC, UA 0 0 - 4 /HPF    WBC, UA 0 0 - 5 /HPF    Bacteria, UA Rare None, Rare, Occasional /HPF    Hyaline Casts, UA 0 0 - 1 /LPF    Microscopic Comment     Lactic acid, plasma #2    Collection Time: 07/15/25  4:45 PM   Result Value Ref Range    Lactic Acid Level 1.0 0.5 - 2.2 mmol/L    ISTAT PROCEDURE    Collection Time: 07/15/25  6:46 PM   Result Value Ref Range    POC PH 7.371 7.35 - 7.45    POC PCO2 44.6 35 - 45 mmHg    POC PO2 110 (H) 80 - 100 mmHg    POC HCO3 25.9 24 - 28 mmol/L    POC BE 1 -2 to 2 mmol/L    POC SATURATED O2 98 95 - 100 %    Sample ARTERIAL     Site RR     Allens Test Pass     DelSys Nasal Can     Mode SPONT     Flow 3    POCT glucose    Collection Time: 07/15/25  8:33 PM   Result Value Ref Range    POCT Glucose 214 (H) 70 - 110 mg/dL   Magnesium    Collection Time: 07/16/25  4:15 AM   Result Value Ref Range    Magnesium  2.1 1.6 - 2.6 mg/dL   Phosphorus    Collection Time: 07/16/25  4:15 AM   Result Value Ref Range    Phosphorus Level 4.2 2.7 - 4.5 mg/dL   Basic metabolic panel    Collection Time: 07/16/25  4:15 AM   Result Value Ref Range    Sodium 137 136 - 145 mmol/L    Potassium 4.8 3.5 - 5.1 mmol/L    Chloride 104 95 - 110 mmol/L    CO2 21 (L) 23 - 29 mmol/L    Glucose 160 (H) 70 - 110 mg/dL    BUN 13 6 - 20 mg/dL    Creatinine 0.7 0.5 - 1.4 mg/dL    Calcium 9.2 8.7 - 10.5 mg/dL    Anion Gap 12 8 - 16 mmol/L    eGFR >60 >60 mL/min/1.73/m2   CBC with Differential    Collection Time: 07/16/25  4:15 AM   Result Value Ref Range    WBC 4.63 3.90 - 12.70 K/uL    RBC 5.47 4.60 - 6.20 M/uL    HGB 15.7 14.0 - 18.0 gm/dL    HCT 49.0 40.0 - 54.0 %    MCV 90 82 - 98 fL    MCH 28.7 27.0 - 31.0 pg    MCHC 32.0 32.0 - 36.0 g/dL    RDW 13.9 11.5 - 14.5 %    Platelet Count 157 150 - 450 K/uL    MPV 10.4 9.2 - 12.9 fL    Nucleated RBC 0 <=0 /100 WBC    Neut % 78.5 (H) 38 - 73 %    Lymph % 15.3 (L) 18 - 48 %    Mono % 5.6 4 - 15 %    Eos % 0.0 <=8 %    Basophil % 0.2 <=1.9 %    Imm Grans % 0.4 0.0 - 0.5 %    Neut # 3.63 1.8 - 7.7 K/uL    Lymph # 0.71 (L) 1 - 4.8 K/uL    Mono # 0.26 (L) 0.3 - 1 K/uL    Eos # 0.00 <=0.5 K/uL    Baso # 0.01 <=0.2 K/uL    Imm Grans # 0.02 0.00 - 0.04 K/uL   POCT glucose    Collection Time: 07/16/25  5:12 AM   Result Value Ref Range    POCT Glucose 173 (H)  70 - 110 mg/dL       Imaging Results:  Imaging Results              CT Chest Without Contrast (Final result)  Result time 07/15/25 16:50:49      Final result by Khadar Rowe MD (07/15/25 16:50:49)                   Impression:     Moderate to severe emphysema.  Worrisome spiculated right upper lobe pulmonary nodule, 1 cm.  Recommend follow-up according to the Fleischner Society criteria.        Fleischner Society 2017 Guidelines - http://bit.ly/fleischner    Solid Nodules - Single    Low risk  Less than 6 mm - No routine follow-up  6-8 mm - CT at 6-12 months, then consider CT at 18-24 months  Greater than 8 mm - Consider CT at 3 months, PET/CT, or tissue sampling    Comments:  Nodules less than 6 mm do not require routine follow-up in low -risk patients    High risk  Less than 6 mm - Optional CT at 12 months  6-8 mm - CT at 6-12 months, then CT at 18-24 months  Greater than 8 mm - Consider CT at 3 months, PET/CT, or tissue sampling    Comments:  Certain patients at high risk with suspicious nodule morphology, upper lobe location, or both may warrant 12-month follow-up            All CT scans at [this location] are performed using dose modulation techniques as appropriate to a performed exam including the following:  Automated exposure control; adjustment of the mA and/or kV according to patient size (this includes techniques or standardized protocols for targeted exams where dose is matched to indication / reason for exam; i.e. extremities or head); use of iterative reconstruction technique.    Finalized on: 7/15/2025 4:50 PM By:  Khadar Rowe MD  [facility]# [id number]      2025-07-15 16:52:57.414     [facility]               Narrative:    EXAM:  CT CHEST WITHOUT CONTRAST    CLINICAL INDICATION: Solitary pulmonary nodule.  Abnormal chest x-ray.    TECHNIQUE: Chest CT without contrast.  Soft tissue and lung algorithms.  Coronal and sagittal reformations.    COMPARISON STUDY:   Chest x-ray 07/15/2025, report only, images  unavailable, chest x-ray 03/15/2025.    FINDINGS: Normal size heart.  There is no coronary artery calcification.  No pericardial effusion.  Normal aorta.    There is no mediastinal or hilar lymphadenopathy.    Trachea and central bronchi are patent.    There is respiratory motion artifact limiting detail, particularly at the lung bases.  There is moderate to severe emphysema, upper lobe involvement.  There is a right upper lobe spiculated pulmonary nodule, 1 x 0.7 x 0.9 cm (images 122-131 series 4; images 124-128 series 602).  No additional pulmonary nodule.  No active infiltrate.    The visualized portion upper abdominal viscera is unremarkable than a left lobe liver cysts, 1.9 cm.    Multilevel mild thoracic spondylosis.                                           X-Ray Chest AP Portable (Final result)  Result time 07/15/25 13:59:25      Final result by SONAM Lim Sr., MD (07/15/25 13:59:25)                   Impression:      There is a poorly visualized 9 mm oval shaped density projected over the right upper lobe.  If additional imaging evaluation is clinically indicated, I recommend consideration of a CT examination of the thorax without IV contrast in 3 months.      Electronically signed by: Cornel Lim MD  Date:    07/15/2025  Time:    13:59               Narrative:    EXAMINATION:  XR CHEST AP PORTABLE    CLINICAL HISTORY:  Sepsis;    COMPARISON:  03/15/2025    FINDINGS:  The size of the heart is normal.  There is a poorly visualized 9 mm oval shaped density projected over the right upper lobe.  There is no focal pulmonary infiltrate visualized in the left lung.  There is no pneumothorax.  The costophrenic angles are sharp.                                       The EKG was ordered, reviewed, and independently interpreted by the ED provider.  Interpretation time: 13:10  Rate: 93 BPM  Rhythm: normal sinus rhythm  Interpretation: Right superior axis deviation. Pulmonary disease pattern. Right  ventricular hypertrophy. No STEMI.         The Emergency Provider reviewed the vital signs and test results, which are outlined above.     ED Discussion     3:41 PM: Discussed case with Pepper Kim NP ([facility]). Dr. Pike  agrees with current care and management of pt and accepts admission.   Admitting Service: Hospital Medicine  Admitting Physician: Dr. Pike  Admit to: Obs tele    3:42 PM: Re-evaluated pt. I have discussed test results, shared treatment plan, and the need for admission with patient/family/caretaker at bedside. Pt and/or family/caretaker express understanding at this time and agree with all information. All questions answered. Pt/caretaker/family member(s) have no further questions or concerns at this time. Pt is ready for admit.        ED Course as of 07/16/25 1128   Tue Jul 15, 2025   1317 EKG personally reviewed.  Normal sinus rhythm rate 93.  Right superior axis deviation with pulmonary disease pattern and RVH.  No STEMI.  Normal intervals. [NF]   1354 X-Ray Chest AP Portable  Reviewed personally.  Negative for pneumonia. [NF]   1519 50-year-old male with a history of asthma and tobacco use presents with shortness of breath and cough.  He does not wear oxygen at home, O2 sat drops in the upper 80s on room air.  Maintaining an oxygen saturation at 95% on 2 L nasal cannula.  Diminished breath sounds and wheezing noted.  Treated with DuoNeb and Solu-Medrol.  Febrile at 100.7 with concern for sepsis due to respiratory pathology therefore cultures obtained and treated with IV antibiotics.  However chest x-ray is unremarkable.  Additionally COVID and influenza negative.  BNP normal.  Lactic acid is normal as well as procalcitonin.  UA is unremarkable.  Cardiac workup shows no ischemia.  He has no leukocytosis. [NF]      ED Course User Index  [NF] Clara Talley, DO     Medical Decision Making  Amount and/or Complexity of Data Reviewed  Labs: ordered. Decision-making details documented  in ED Course.  Radiology: ordered. Decision-making details documented in ED Course.  ECG/medicine tests: ordered and independent interpretation performed. Decision-making details documented in ED Course.    Risk  OTC drugs.  Prescription drug management.  Decision regarding hospitalization.    Critical Care  Total time providing critical care: 47 minutes       Additional MDM:   Differential Diagnosis:   COPD exacerbation, asthma exacerbation, pneumonia, sepsis, bronchitis, COVID, influenza, CHF.             ED Medication(s):  Medications   acetaminophen tablet 650 mg (650 mg Oral Not Given 7/15/25 1352)   atorvastatin tablet 10 mg (10 mg Oral Given 7/15/25 2035)   sodium chloride 0.9% flush 10 mL (has no administration in time range)   acetaminophen tablet 650 mg (has no administration in time range)   HYDROcodone-acetaminophen 5-325 mg per tablet 1 tablet (has no administration in time range)   sodium chloride 0.9% flush 3 mL (has no administration in time range)   albuterol-ipratropium 2.5 mg-0.5 mg/3 mL nebulizer solution 3 mL (3 mLs Nebulization Given 7/16/25 0733)   acetaminophen tablet 650 mg (has no administration in time range)   methylPREDNISolone sodium succinate injection 40 mg (40 mg Intravenous Given 7/16/25 0514)   cefTRIAXone injection 1 g (1 g Intravenous Given 7/16/25 0924)     And   azithromycin tablet 250 mg (250 mg Oral Given 7/16/25 0924)   glucose chewable tablet 16 g (has no administration in time range)   glucose chewable tablet 24 g (has no administration in time range)   dextrose 50% injection 12.5 g (has no administration in time range)   dextrose 50% injection 25 g (has no administration in time range)   glucagon (human recombinant) injection 1 mg (has no administration in time range)   insulin aspart U-100 pen 0-10 Units ( Subcutaneous Not Given 7/16/25 0514)   budesonide nebulizer solution 0.5 mg (0.5 mg Nebulization Given 7/16/25 0733)   arformoteroL nebulizer solution 15 mcg (15 mcg  Nebulization Given 7/16/25 0733)   nicotine 21 mg/24 hr 1 patch (1 patch Transdermal Not Given 7/16/25 0900)   dextromethorphan-guaiFENesin  mg/5 ml liquid 5 mL (5 mLs Oral Given 7/16/25 1011)   albuterol-ipratropium 2.5 mg-0.5 mg/3 mL nebulizer solution 3 mL (3 mLs Nebulization Given 7/15/25 1451)   methylPREDNISolone sodium succinate injection 125 mg (125 mg Intravenous Given 7/15/25 1443)   cefTRIAXone injection 1 g (1 g Intravenous Given 7/15/25 1530)   doxycycline 100 mg in D5W 100 mL IVPB (MB+) (0 mg Intravenous Stopped 7/15/25 1642)   azithromycin tablet 500 mg (500 mg Oral Given 7/15/25 1801)   ALPRAZolam tablet 0.25 mg (0.25 mg Oral Given 7/16/25 1011)       Current Discharge Medication List        START taking these medications    Details   blood sugar diagnostic Strp To check blood glucose 2 times daily  Qty: 100 strip, Refills: 3      blood-glucose meter (TRUE METRIX GLUCOSE METER) Misc Use to check blood sugar twice daily  Qty: 1 each, Refills: 0      lancets (TRUEPLUS LANCETS) 33 gauge Misc To check blood glucose 2 times daily  Qty: 100 each, Refills: 3                     Scribe Attestation:   Scribe #1: I performed the above scribed service and the documentation accurately describes the services I performed. I attest to the accuracy of the note.     Attending:   Physician Attestation Statement for Scribe #1: I, Clara Talley DO, personally performed the services described in this documentation, as scribed by Lien Talley, in my presence, and it is both accurate and complete.           Clinical Impression       ICD-10-CM ICD-9-CM   1. Acute hypoxic respiratory failure  J96.01 518.81   2. Shortness of breath  R06.02 786.05   3. Sepsis with acute hypoxic respiratory failure without septic shock, due to unspecified organism  A41.9 038.9    R65.20 995.91    J96.01 518.81   4. Type 2 diabetes mellitus without complication, without long-term current use of insulin  E11.9 250.00        Disposition:   Disposition: Placed in Observation  Condition: Stable       Clara Talley,   07/16/25 1128

## 2025-07-15 NOTE — ASSESSMENT & PLAN NOTE
-A1c 1 month ago was 6.5 but patient does not appear to be on any home diabetic medications   -SSI and Accu-Cheks   -diabetic diet   -monitor glucose closely as patient will be on steroids for his COPD exacerbation  -consult nutrition and diabetic educator

## 2025-07-15 NOTE — SUBJECTIVE & OBJECTIVE
Past Medical History:   Diagnosis Date    Asthma     Chronic knee pain     Hyperlipidemia     Lower leg fracture     Right, as a child       Past Surgical History:   Procedure Laterality Date    COLONOSCOPY N/A 9/19/2024    Procedure: COLONOSCOPY;  Surgeon: Melania Muñiz MD;  Location: [facility];  Service: Gastroenterology;  Laterality: N/A;    NONE         Review of patient's allergies indicates:   Allergen Reactions    Beef containing products     Shellfish containing products        No current facility-administered medications on file prior to encounter.     Current Outpatient Medications on File Prior to Encounter   Medication Sig    atorvastatin (LIPITOR) 10 MG tablet Take 1 tablet (10 mg total) by mouth every evening.    TRELEGY ELLIPTA 200-62.5-25 mcg inhaler Inhale 1 puff into the lungs once daily.    albuterol (PROVENTIL/VENTOLIN HFA) 90 mcg/actuation inhaler Inhale 1-2 puffs into the lungs every 4 (four) hours as needed for Wheezing. Rescue    [DISCONTINUED] meloxicam (MOBIC) 15 MG tablet TAKE 1 TABLET BY MOUTH ONCE DAILY AS NEEDED FOR PAIN     Family History       Problem Relation (Age of Onset)    Allergies Son    Diabetes Mother, Brother    Hypertension Brother          Tobacco Use    Smoking status: Every Day     Current packs/day: 1.00     Average packs/day: 1 pack/day for 30.0 years (30.0 ttl pk-yrs)     Types: Cigarettes    Smokeless tobacco: Never   Substance and Sexual Activity    Alcohol use: Not Currently     Comment: Monthly or less    Drug use: Never    Sexual activity: Yes     Partners: Female     Review of Systems   Constitutional:  Negative for activity change, appetite change, chills, fatigue and fever.   Respiratory:  Positive for cough and shortness of breath. Negative for apnea and stridor.         Orthopnea   Cardiovascular:  Negative for chest pain, palpitations and leg swelling.   Gastrointestinal:  Negative for abdominal distention, constipation, diarrhea, nausea and vomiting.    Genitourinary:  Negative for difficulty urinating, dysuria, frequency, hematuria and urgency.   Musculoskeletal:  Negative for arthralgias, back pain, gait problem and joint swelling.   Neurological:  Negative for dizziness, seizures, weakness, light-headedness, numbness and headaches.   Psychiatric/Behavioral:  Negative for agitation, behavioral problems, confusion, decreased concentration, dysphoric mood and hallucinations.    All other systems reviewed and are negative.    Objective:     Vital Signs (Most Recent):  Temp: 99.9 °F (37.7 °C) (07/15/25 1646)  Pulse: 86 (07/15/25 1646)  Resp: (!) 24 (07/15/25 1646)  BP: 113/66 (07/15/25 1646)  SpO2: 96 % (07/15/25 1646) Vital Signs (24h Range):  Temp:  [99.4 °F (37.4 °C)-100.7 °F (38.2 °C)] 99.9 °F (37.7 °C)  Pulse:  [83-97] 86  Resp:  [18-24] 24  SpO2:  [90 %-99 %] 96 %  BP: (113-144)/(66-90) 113/66     Weight: 66.3 kg (146 lb 2.6 oz)  Body mass index is 28.55 kg/m².     Physical Exam  Vitals and nursing note reviewed.   Constitutional:       Appearance: Normal appearance.   HENT:      Head: Normocephalic and atraumatic.      Nose: Nose normal.      Mouth/Throat:      Mouth: Mucous membranes are moist.   Eyes:      Extraocular Movements: Extraocular movements intact.      Conjunctiva/sclera: Conjunctivae normal.   Cardiovascular:      Rate and Rhythm: Normal rate and regular rhythm.      Pulses: Normal pulses.      Heart sounds: Normal heart sounds.   Pulmonary:      Effort: Respiratory distress present.      Breath sounds: Normal breath sounds.      Comments: Muffled breath sounds bilaterally  Abdominal:      General: Abdomen is flat. Bowel sounds are normal.      Palpations: Abdomen is soft.   Musculoskeletal:         General: No swelling. Normal range of motion.      Cervical back: Normal range of motion and neck supple.   Skin:     General: Skin is warm.      Capillary Refill: Capillary refill takes less than 2 seconds.   Neurological:      Mental Status: He is  alert and oriented to person, place, and time. Mental status is at baseline.   Psychiatric:         Mood and Affect: Mood normal.         Behavior: Behavior normal.                Significant Labs: All pertinent labs within the past 24 hours have been reviewed.  Recent Lab Results  (Last 5 results in the past 24 hours)        07/15/25  1645   07/15/25  1548   07/15/25  1431   07/15/25  1351   07/15/25  1346        Influenza A, Molecular         Negative       Influenza B, Molecular         Negative       Procalcitonin       0.07  Comment: A concentration < 0.25 ng/mL represents a low risk of bacterial infection.  Procalcitonin may not be accurate among patients with localized   infection, recent trauma or major surgery, immunosuppressed state,   invasive fungal infection, renal dysfunction. Decisions regarding   initiation or continuation of antibiotic therapy should not be based   solely on procalcitonin levels.         Albumin       3.5         ALP       66         ALT       40         Anion Gap       9         Appearance, UA   Clear             AST       24         Bacteria, UA   Rare             Baso #       0.02         Basophil %       0.3         Bilirubin (UA)   Negative             BILIRUBIN TOTAL       0.3  Comment: For infants and newborns, interpretation of results should be based   on gestational age, weight and in agreement with clinical   observations.    Premature Infant recommended reference ranges:   0-24 hours:  <8.0 mg/dL   24-48 hours: <12.0 mg/dL   3-5 days:    <15.0 mg/dL   6-29 days:   <15.0 mg/dL         BNP       <10  Comment: Values of less than 100 pg/ml are consistent with non-CHF populations.          BUN       13         Calcium       8.7         Chloride       107         CO2       21         Color, UA   Yellow             SARS COV-2 MOLECULAR         Negative  Comment: This test utilizes isothermal nucleic acid amplification technology to detect the SARS-CoV-2 RdRp nucleic acid  segment. The analytical sensitivity (limit of detection) is 500 copies/swab.     A POSITIVE result is indicative of the presence of SARS-CoV-2 RNA; clinical correlation with patient history and other diagnostic information is necessary to determine patient infection status.    A NEGATIVE result means that SARS-CoV-2 nucleic acids are not present above the limit of detection. A NEGATIVE result should be treated as presumptive. It does not rule out the possibility of COVID-19 and should not be the sole basis for treatment decisions.    This test is Food and Drug Administration (FDA) approved.  Performance characteristics of this test has been independently verified by [facility] Department of Pathology and Laboratory Medicine.       Creatinine       0.6         eGFR       >60  Comment: Estimated GFR calculated using the CKD-EPI creatinine (2021) equation.         Eos #       0.04         Eos %       0.6         Glucose       103         Glucose, UA   Negative             Gran # (ANC)       5.07         Hematocrit       48.0         Hemoglobin       15.8         Hyaline Casts, UA   0             Immature Grans (Abs)       0.04  Comment: Mild elevation in immature granulocytes is non specific and can be seen in a variety of conditions including stress response, acute inflammation, trauma and pregnancy. Correlation with other laboratory and clinical findings is essential.         Immature Granulocytes       0.6         Ketones, UA   Negative             Lactic Acid Level 1.0     0.6           Leukocyte Esterase, UA   Negative             Lymph #       1.42         Lymph %       19.7         Magnesium        1.8         MCH       29.0         MCHC       32.9         MCV       88         Microscopic Comment     Comment: Other formed elements not mentioned in the report are not present in the microscopic examination.             Mono #       0.62         Mono %       8.6         MPV       9.6         Neut %        70.2         NITRITE UA   Negative             nRBC       0         Blood, UA   1+             pH, UA   6.0             Platelet Count       149         Potassium       3.7         PROTEIN TOTAL       7.4         Protein, UA   Trace  Comment: Recommend a 24 hour urine protein or a urine protein/creatinine ratio if globulin induced proteinuria is clinically suspected.             RBC       5.44         RBC, UA   0             RDW       14.1         Sodium       137         Spec Grav UA   1.020             Troponin I       <0.006  Comment: The reference interval for Troponin I represents the 99th percentile cutoff for our facility and is consistent with 3rd generation assay performance.         Urobilinogen, UA   Negative             WBC, UA   0             WBC       7.21                                Significant Imaging:   CT Chest Without Contrast   Final Result    Moderate to severe emphysema.  Worrisome spiculated right upper lobe pulmonary nodule, 1 cm.  Recommend follow-up according to the Fleischner Society criteria.            Fleischner Society 2017 Guidelines - http://bit.ly/fleischner      Solid Nodules - Single      Low risk   Less than 6 mm - No routine follow-up   6-8 mm - CT at 6-12 months, then consider CT at 18-24 months   Greater than 8 mm - Consider CT at 3 months, PET/CT, or tissue sampling      Comments:   Nodules less than 6 mm do not require routine follow-up in low -risk patients      High risk   Less than 6 mm - Optional CT at 12 months   6-8 mm - CT at 6-12 months, then CT at 18-24 months   Greater than 8 mm - Consider CT at 3 months, PET/CT, or tissue sampling      Comments:   Certain patients at high risk with suspicious nodule morphology, upper lobe location, or both may warrant 12-month follow-up                  All CT scans at [this location] are performed using dose modulation techniques as appropriate to a performed exam including the following:  Automated exposure control;  adjustment of the mA and/or kV according to patient size (this includes techniques or standardized protocols for targeted exams where dose is matched to indication / reason for exam; i.e. extremities or head); use of iterative reconstruction technique.      Finalized on: 7/15/2025 4:50 PM By:  Khadar Rowe MD   [facility]# [id number]      2025-07-15 16:52:[id number]     [facility]      X-Ray Chest AP Portable   Final Result      There is a poorly visualized 9 mm oval shaped density projected over the right upper lobe.  If additional imaging evaluation is clinically indicated, I recommend consideration of a CT examination of the thorax without IV contrast in 3 months.         Electronically signed by: Cornel Lim MD   Date:    07/15/2025   Time:    13:59

## 2025-07-15 NOTE — ASSESSMENT & PLAN NOTE
In an acute exacerbation and not a candidate for intervention at this time.  Follow up in clinic for further testing and plan of action.

## 2025-07-15 NOTE — HPI
Mr. Hathaway is a 50-year-old male with past medical history of hyperlipidemia, asthma, diabetes, and tobacco abuse.  Patient admits to smoking 1 pack per day and was recently seen by a allergist who feels he likely has COPD for which the patient has been referred to pulmonology but follow up appointment is in 1 month.  He states his shortness a breath had started approximately 6 months ago but had worsened over the last 2 days hence prompting him to come to the ER.  Patient was noted to have a low-grade fever of 100.7 on arrival.  Labs on arrival are unremarkable and chest x-ray showed There is a poorly visualized 9 mm oval shaped density projected over the right upper lobe.  If additional imaging evaluation is clinically indicated, I recommend consideration of a CT examination of the thorax without IV contrast in 3 months.  CT chest was ordered and showed moderate to severe emphysema.  Worrisome spiculated right upper lobe pulmonary nodule, 1 cm.  Pulmonary is consulted for evaluation as patient is high-risk with a notable upper lobe nodule that may need to be biopsied.  Patient has been started on IV steroids and breathing treatments as well as continued inhaler.  An echocardiogram was considered but patient was noted to have had a stress test on 06/03/2025 which noted an EF of 65% and BNP is less than 10.  Hospital medicine will admit for observation for emphysema exacerbation.

## 2025-07-15 NOTE — ASSESSMENT & PLAN NOTE
-patient has a 1 pack per day history of smoking   -pulmonary consulted for possible need for tissue biopsy.  -patient does have an appointment in 1 month to establish care with pulmonology

## 2025-07-15 NOTE — PLAN OF CARE
[facility] - Emergency Dept.  Initial Discharge Assessment       Primary Care Provider: Simi Johnson MD    Admission Diagnosis: No admission diagnoses are documented for this encounter.    Admission Date: 7/15/2025  Expected Discharge Date:     Transition of Care Barriers: (P) None    Payor: MEDICAID / Plan: [employer] / Product Type: Managed Medicaid /     Extended Emergency Contact Information  Primary Emergency Contact: Kenna Hathaway  Mobile Phone: [phone]  Relation: Daughter  Preferred language: English   needed? No  Secondary Emergency Contact: HathawayDontrell sinclair   [place]  Mobile Phone: [phone]  Relation: Spouse    Discharge Plan A: (P) Home with family         [id number] - [place], [place] - [address].  [address]  [facility] [place] [address]  Phone: [phone] Fax: [phone]    [id number] - [place], [place] - [address]  [address] [place] [address]  Phone: [phone] Fax: [phone]      Initial Assessment (most recent)       Adult Discharge Assessment - 07/15/25 1449          Discharge Assessment    Assessment Type Discharge Planning Assessment (P)      Confirmed/corrected address, phone number and insurance Yes (P)      Confirmed Demographics Correct on Facesheet (P)      Source of Information patient;family (P)      Communicated MEL with patient/caregiver No (P)      People in Home spouse;child(uche), adult (P)      Name(s) of People in Home Kenna Hathaway, daughter [phone] and Joel Hathaway, wife, [phone] (P)      Facility Arrived From: Home (P)      Do you expect to return to your current living situation? Yes (P)      Do you have help at home or someone to help you manage your care at home? Yes (P)      Who are your caregiver(s) and their phone number(s)? Kenna Hathaway, daughter [phone] and Joel Hathaway, wife, [phone] (P)      Prior to hospitilization cognitive status: Alert/Oriented  (P)      Current cognitive status: Alert/Oriented (P)      Walking or Climbing Stairs Difficulty no (P)      Dressing/Bathing Difficulty no (P)      Home Accessibility wheelchair accessible (P)      Home Layout Able to live on 1st floor (P)      Equipment Currently Used at Home none (P)      Readmission within 30 days? No (P)      Patient currently being followed by outpatient case management? No (P)      Do you currently have service(s) that help you manage your care at home? No (P)      Do you take prescription medications? Yes (P)      Do you have prescription coverage? Yes (P)      Do you have any problems affording any of your prescribed medications? No (P)      Who is going to help you get home at discharge? Kenna Hathaway, daughter [phone] (P)      How do you get to doctors appointments? car, drives self (P)      Are you on dialysis? No (P)      Do you take coumadin? No (P)      Discharge Plan A Home with family (P)      DME Needed Upon Discharge  none (P)      Discharge Plan discussed with: Patient;Adult children (P)      Transition of Care Barriers None (P)         Physical Activity    On average, how many days per week do you engage in moderate to strenuous exercise (like a brisk walk)? 0 days (P)      On average, how many minutes do you engage in exercise at this level? 0 min (P)         Financial Resource Strain    How hard is it for you to pay for the very basics like food, housing, medical care, and heating? Not hard at all (P)         Housing Stability    In the last 12 months, was there a time when you were not able to pay the mortgage or rent on time? No (P)      At any time in the past 12 months, were you homeless or living in a shelter (including now)? No (P)         Transportation Needs    In the past 12 months, has lack of transportation kept you from medical appointments or from getting medications? No (P)      In the past 12 months, has lack of transportation kept you from meetings,  work, or from getting things needed for daily living? No (P)         Food Insecurity    Within the past 12 months, you worried that your food would run out before you got the money to buy more. Never true (P)      Within the past 12 months, the food you bought just didn't last and you didn't have money to get more. Never true (P)         Stress    Do you feel stress - tense, restless, nervous, or anxious, or unable to sleep at night because your mind is troubled all the time - these days? Not at all (P)         Social Isolation    How often do you feel lonely or isolated from those around you?  Never (P)         Alcohol Use    Q1: How often do you have a drink containing alcohol? Never (P)      Q2: How many drinks containing alcohol do you have on a typical day when you are drinking? Patient does not drink (P)      Q3: How often do you have six or more drinks on one occasion? Never (P)         Utilities    In the past 12 months has the electric, gas, oil, or water company threatened to shut off services in your home? No (P)         Health Literacy    How often do you need to have someone help you when you read instructions, pamphlets, or other written material from your doctor or pharmacy? Never (P)                       CM spoke with patient to explain role and discuss discharge planning. Patient currently lives at address on face sheet with his family.    Level of function: Functioning daily ADLs with no HME assistance     PCP: Daughter stated patient see Simi Johnson MD  as his PCP    Anticipated DC Dispo: Home with family.     Patient does not receive dialysis. Patient is not taking coumadin.    Needs will depend on hospital progress; CM following for needs.

## 2025-07-15 NOTE — SUBJECTIVE & OBJECTIVE
Past Medical History:   Diagnosis Date    Asthma     Chronic knee pain     Hyperlipidemia     Lower leg fracture     Right, as a child       Past Surgical History:   Procedure Laterality Date    COLONOSCOPY N/A 9/19/2024    Procedure: COLONOSCOPY;  Surgeon: Melania Muñiz MD;  Location: [facility];  Service: Gastroenterology;  Laterality: N/A;    NONE         Review of patient's allergies indicates:   Allergen Reactions    Beef containing products     Shellfish containing products        Family History       Problem Relation (Age of Onset)    Allergies Son    Diabetes Mother, Brother    Hypertension Brother          Tobacco Use    Smoking status: Every Day     Current packs/day: 1.00     Average packs/day: 1 pack/day for 30.0 years (30.0 ttl pk-yrs)     Types: Cigarettes    Smokeless tobacco: Never   Substance and Sexual Activity    Alcohol use: Not Currently     Comment: Monthly or less    Drug use: Never    Sexual activity: Yes     Partners: Female         Review of Systems   All other systems reviewed and are negative.    Objective:     Vital Signs (Most Recent):  Temp: 99.9 °F (37.7 °C) (07/15/25 1646)  Pulse: 86 (07/15/25 1646)  Resp: (!) 24 (07/15/25 1646)  BP: 113/66 (07/15/25 1646)  SpO2: 96 % (07/15/25 1646) Vital Signs (24h Range):  Temp:  [99.4 °F (37.4 °C)-100.7 °F (38.2 °C)] 99.9 °F (37.7 °C)  Pulse:  [83-97] 86  Resp:  [18-24] 24  SpO2:  [90 %-99 %] 96 %  BP: (113-144)/(66-90) 113/66     Weight: 66.3 kg (146 lb 2.6 oz)  Body mass index is 28.55 kg/m².    No intake or output data in the 24 hours ending 07/15/25 1814     Physical Exam  Vitals and nursing note reviewed.   Constitutional:       General: He is in acute distress.      Appearance: He is ill-appearing. He is not toxic-appearing or diaphoretic.   HENT:      Head: Normocephalic.   Eyes:      Pupils: Pupils are equal, round, and reactive to light.   Cardiovascular:      Pulses: Normal pulses.   Pulmonary:      Breath sounds: Wheezing present.    Abdominal:      Palpations: Abdomen is soft.   Musculoskeletal:         General: Normal range of motion.   Skin:     General: Skin is warm.   Neurological:      General: No focal deficit present.      Mental Status: He is alert.          Vents:       Lines/Drains/Airways       Peripheral Intravenous Line  Duration             Peripheral IV Single Lumen 07/15/25 1438 20 G Anterior;Left Forearm <1 day    Peripheral IV Single Lumen 07/15/25 1440 20 G 1 in Anterior;Right Forearm <1 day                    Significant Labs:    CBC/Anemia Profile:  Recent Labs   Lab 07/15/25  1351   WBC 7.21   HGB 15.8   HCT 48.0   *   MCV 88   RDW 14.1        Chemistries:  Recent Labs   Lab 07/15/25  1351      K 3.7      CO2 21*   BUN 13   CREATININE 0.6   CALCIUM 8.7   ALBUMIN 3.5   PROT 7.4   BILITOT 0.3   ALKPHOS 66   ALT 40   AST 24   MG 1.8       All pertinent labs within the past 24 hours have been reviewed.    Significant Imaging:   I have reviewed all pertinent imaging results/findings within the past 24 hours.

## 2025-07-16 PROBLEM — J44.1 COPD EXACERBATION: Status: ACTIVE | Noted: 2025-07-16

## 2025-07-16 LAB
ABSOLUTE EOSINOPHIL (OHS): 0 K/UL
ABSOLUTE MONOCYTE (OHS): 0.26 K/UL (ref 0.3–1)
ABSOLUTE NEUTROPHIL COUNT (OHS): 3.63 K/UL (ref 1.8–7.7)
ANION GAP (OHS): 12 MMOL/L (ref 8–16)
BASOPHILS # BLD AUTO: 0.01 K/UL
BASOPHILS NFR BLD AUTO: 0.2 %
BUN SERPL-MCNC: 13 MG/DL (ref 6–20)
CALCIUM SERPL-MCNC: 9.2 MG/DL (ref 8.7–10.5)
CHLORIDE SERPL-SCNC: 104 MMOL/L (ref 95–110)
CO2 SERPL-SCNC: 21 MMOL/L (ref 23–29)
CREAT SERPL-MCNC: 0.7 MG/DL (ref 0.5–1.4)
ERYTHROCYTE [DISTWIDTH] IN BLOOD BY AUTOMATED COUNT: 13.9 % (ref 11.5–14.5)
GFR SERPLBLD CREATININE-BSD FMLA CKD-EPI: >60 ML/MIN/1.73/M2
GLUCOSE SERPL-MCNC: 160 MG/DL (ref 70–110)
HCT VFR BLD AUTO: 49 % (ref 40–54)
HGB BLD-MCNC: 15.7 GM/DL (ref 14–18)
HOLD SPECIMEN: NORMAL
IMM GRANULOCYTES # BLD AUTO: 0.02 K/UL (ref 0–0.04)
IMM GRANULOCYTES NFR BLD AUTO: 0.4 % (ref 0–0.5)
LYMPHOCYTES # BLD AUTO: 0.71 K/UL (ref 1–4.8)
MAGNESIUM SERPL-MCNC: 2.1 MG/DL (ref 1.6–2.6)
MCH RBC QN AUTO: 28.7 PG (ref 27–31)
MCHC RBC AUTO-ENTMCNC: 32 G/DL (ref 32–36)
MCV RBC AUTO: 90 FL (ref 82–98)
NUCLEATED RBC (/100WBC) (OHS): 0 /100 WBC
PHOSPHATE SERPL-MCNC: 4.2 MG/DL (ref 2.7–4.5)
PLATELET # BLD AUTO: 157 K/UL (ref 150–450)
PMV BLD AUTO: 10.4 FL (ref 9.2–12.9)
POCT GLUCOSE: 161 MG/DL (ref 70–110)
POCT GLUCOSE: 170 MG/DL (ref 70–110)
POCT GLUCOSE: 173 MG/DL (ref 70–110)
POTASSIUM SERPL-SCNC: 4.8 MMOL/L (ref 3.5–5.1)
RBC # BLD AUTO: 5.47 M/UL (ref 4.6–6.2)
RELATIVE EOSINOPHIL (OHS): 0 %
RELATIVE LYMPHOCYTE (OHS): 15.3 % (ref 18–48)
RELATIVE MONOCYTE (OHS): 5.6 % (ref 4–15)
RELATIVE NEUTROPHIL (OHS): 78.5 % (ref 38–73)
SODIUM SERPL-SCNC: 137 MMOL/L (ref 136–145)
WBC # BLD AUTO: 4.63 K/UL (ref 3.9–12.7)

## 2025-07-16 PROCEDURE — 27000221 HC OXYGEN, UP TO 24 HOURS

## 2025-07-16 PROCEDURE — 84100 ASSAY OF PHOSPHORUS: CPT | Performed by: FAMILY MEDICINE

## 2025-07-16 PROCEDURE — 63700000 PHARM REV CODE 250 ALT 637 W/O HCPCS: Performed by: FAMILY MEDICINE

## 2025-07-16 PROCEDURE — 21400001 HC TELEMETRY ROOM

## 2025-07-16 PROCEDURE — 85025 COMPLETE CBC W/AUTO DIFF WBC: CPT | Performed by: FAMILY MEDICINE

## 2025-07-16 PROCEDURE — 94761 N-INVAS EAR/PLS OXIMETRY MLT: CPT

## 2025-07-16 PROCEDURE — 25000003 PHARM REV CODE 250: Performed by: FAMILY MEDICINE

## 2025-07-16 PROCEDURE — 94799 UNLISTED PULMONARY SVC/PX: CPT

## 2025-07-16 PROCEDURE — 36415 COLL VENOUS BLD VENIPUNCTURE: CPT | Performed by: FAMILY MEDICINE

## 2025-07-16 PROCEDURE — 25000242 PHARM REV CODE 250 ALT 637 W/ HCPCS: Performed by: FAMILY MEDICINE

## 2025-07-16 PROCEDURE — 63600175 PHARM REV CODE 636 W HCPCS: Mod: JZ,TB | Performed by: FAMILY MEDICINE

## 2025-07-16 PROCEDURE — 83735 ASSAY OF MAGNESIUM: CPT | Performed by: FAMILY MEDICINE

## 2025-07-16 PROCEDURE — 99900035 HC TECH TIME PER 15 MIN (STAT)

## 2025-07-16 PROCEDURE — 80048 BASIC METABOLIC PNL TOTAL CA: CPT | Performed by: FAMILY MEDICINE

## 2025-07-16 PROCEDURE — 94640 AIRWAY INHALATION TREATMENT: CPT | Mod: XB

## 2025-07-16 RX ORDER — DEXTROSE 4 G
TABLET,CHEWABLE ORAL
Qty: 1 EACH | Refills: 0 | Status: SHIPPED | OUTPATIENT
Start: 2025-07-16

## 2025-07-16 RX ORDER — IPRATROPIUM BROMIDE AND ALBUTEROL SULFATE 2.5; .5 MG/3ML; MG/3ML
3 SOLUTION RESPIRATORY (INHALATION) EVERY 6 HOURS PRN
Status: DISCONTINUED | OUTPATIENT
Start: 2025-07-16 | End: 2025-07-18 | Stop reason: HOSPADM

## 2025-07-16 RX ORDER — LANCETS 33 GAUGE
EACH MISCELLANEOUS
Qty: 100 EACH | Refills: 3 | Status: SHIPPED | OUTPATIENT
Start: 2025-07-16

## 2025-07-16 RX ORDER — ALPRAZOLAM 0.25 MG/1
0.25 TABLET ORAL ONCE
Status: COMPLETED | OUTPATIENT
Start: 2025-07-16 | End: 2025-07-16

## 2025-07-16 RX ORDER — GUAIFENESIN AND DEXTROMETHORPHAN HYDROBROMIDE 10; 100 MG/5ML; MG/5ML
5 SYRUP ORAL EVERY 4 HOURS PRN
Status: DISCONTINUED | OUTPATIENT
Start: 2025-07-16 | End: 2025-07-18 | Stop reason: HOSPADM

## 2025-07-16 RX ADMIN — ARFORMOTEROL TARTRATE 15 MCG: 15 SOLUTION RESPIRATORY (INHALATION) at 07:07

## 2025-07-16 RX ADMIN — IPRATROPIUM BROMIDE AND ALBUTEROL SULFATE 3 ML: 2.5; .5 SOLUTION RESPIRATORY (INHALATION) at 07:07

## 2025-07-16 RX ADMIN — ATORVASTATIN CALCIUM 10 MG: 10 TABLET, FILM COATED ORAL at 09:07

## 2025-07-16 RX ADMIN — BUDESONIDE INHALATION 0.5 MG: 0.5 SUSPENSION RESPIRATORY (INHALATION) at 07:07

## 2025-07-16 RX ADMIN — METHYLPREDNISOLONE SODIUM SUCCINATE 40 MG: 40 INJECTION, POWDER, FOR SOLUTION INTRAMUSCULAR; INTRAVENOUS at 05:07

## 2025-07-16 RX ADMIN — METHYLPREDNISOLONE SODIUM SUCCINATE 40 MG: 40 INJECTION, POWDER, FOR SOLUTION INTRAMUSCULAR; INTRAVENOUS at 02:07

## 2025-07-16 RX ADMIN — ALPRAZOLAM 0.25 MG: 0.25 TABLET ORAL at 10:07

## 2025-07-16 RX ADMIN — GUAIFENESIN AND DEXTROMETHORPHAN 5 ML: 100; 10 SYRUP ORAL at 02:07

## 2025-07-16 RX ADMIN — ACETAMINOPHEN 650 MG: 325 TABLET ORAL at 02:07

## 2025-07-16 RX ADMIN — CEFTRIAXONE 1 G: 1 INJECTION, POWDER, FOR SOLUTION INTRAMUSCULAR; INTRAVENOUS at 09:07

## 2025-07-16 RX ADMIN — IPRATROPIUM BROMIDE AND ALBUTEROL SULFATE 3 ML: 2.5; .5 SOLUTION RESPIRATORY (INHALATION) at 01:07

## 2025-07-16 RX ADMIN — METHYLPREDNISOLONE SODIUM SUCCINATE 40 MG: 40 INJECTION, POWDER, FOR SOLUTION INTRAMUSCULAR; INTRAVENOUS at 09:07

## 2025-07-16 RX ADMIN — GUAIFENESIN AND DEXTROMETHORPHAN 5 ML: 100; 10 SYRUP ORAL at 10:07

## 2025-07-16 RX ADMIN — AZITHROMYCIN DIHYDRATE 250 MG: 250 TABLET ORAL at 09:07

## 2025-07-16 NOTE — CONSULTS
"Food & Nutrition Education    Diet Education: Diabetes diet    Learners: Patient    Nutrition Education provided with handouts:  "Carbohydrate Counting for People with Diabetes"  Plate Method for Diabetes  Choose Your Foods: Foods Lists for Diabetes  (NutritionCareManual.org)    Comments:  PMH: HLD, T2DM, Tobacco use disorder, Pulmonary nodule  Hx: Overweight    50 y.o. Male admitted for Acute hypoxic respiratory failure. Pt is currently on a Consistent carbohydrate 2000 calorie diet. RD attempted to visit pt at bedside, pt sleeping. Provided pt with a menu to help encourage pt preferred food choices. Per chart: 100% PO intake, current weight charted 7/15/25 145 lbs (BMI 28.37, Overweight).     RD provided pt with nutrition education handouts on consistent carbohydrate intake.    Handouts recommend a well balanced diet with a variety of fresh foods, fruits and vegetables (5 cups/day), whole grains (3 oz/day), and fat-free or low fat dairy; 225-275g (45-55%) daily CHO (goal: 3-4 servings (45-60 g) of carbs per meal with snacks in between).      Handouts discuss importance of reading food packages, food labels, and nutrition facts labels to identify nutrient content of food; the importance of fiber (especially soluble fiber), dietary sources, and a goal intake of >20-30g/day; the importance of carbohydrates in the diet, but with diabetes, focusing on consistent carb intake throughout the day with emphasis on protein and fiber; and how to set up a meal plate using the plate method, with half of the plate being non starchy vegetables, one fourth protein, one  fourth carbohydrates and emphasizes on counting carbohydrates from beverages as well.     Pt wife present, resting. Left nutrition education with pt's wife, encouraged them to read handouts and use RD contact information with any questions/concerns that they may have, will discuss at follow up. Note additional nutrition education attached to pt's d/c papers as " well.     Nutrition Related Social Determinants of Health: SDOH: Unable to assess at this time.     MST score = none d/t new admit.    Visual NFPE performed, pt appears nourished.  Provided handout with dietitian's contact information.  *Please re-consult as needed.  Thank you,  ANA PAULA Richardson, RDN, LDN

## 2025-07-16 NOTE — ASSESSMENT & PLAN NOTE
Patient with Hypoxic Respiratory failure which is Acute on chronic.  he is not on home oxygen. Supplemental oxygen was provided and noted- Oxygen Concentration (%):  [24-28] 24    .   Signs/symptoms of respiratory failure include- tachypnea and increased work of breathing. Contributing diagnoses includes - COPD Labs and images were reviewed. Patient Has not had a recent ABG, but ordered. Will treat underlying causes and adjust management of respiratory failure as follows- steroids, DuoNebs, Trelegy, Pulmonary consult    -new diagnosis of COPD.  CT chest shows emphysema and pulmonary nodule.  -echocardiogram was considered but patient had a stress test 1 month ago that showed an EF of 65% and BNP <10

## 2025-07-16 NOTE — ASSESSMENT & PLAN NOTE
- Patient with Hypoxic Respiratory failure which is Acute. He is not on home oxygen. Supplemental oxygen was provided and noted- Oxygen Concentration (%):  [24-28] 24  - Signs/symptoms of respiratory failure include: tachypnea, increased work of breathing, respiratory distress, and use of accessory muscles.   - Contributing diagnoses includes - COPD Labs and images were reviewed. Patient Has not had a recent ABG.   - Agree with hospitalist orders  - Continue with supplemental O2 as needed, currently 1L  - Still complains of sob with cough/lying flat   - No wheezing currently on exam  - Continue with LABA/ICS + prn nebs  - Recheck echo  - Productive cough, order sputum cx; continue empiric abx for now  - OOB as able, IS

## 2025-07-16 NOTE — PLAN OF CARE
Problem: Adult Inpatient Plan of Care  Goal: Plan of Care Review  Outcome: Progressing  Goal: Patient-Specific Goal (Individualized)  Outcome: Progressing  Goal: Absence of Hospital-Acquired Illness or Injury  Outcome: Progressing  Goal: Optimal Comfort and Wellbeing  Outcome: Progressing  Goal: Readiness for Transition of Care  Outcome: Progressing     Problem: Diabetes Comorbidity  Goal: Blood Glucose Level Within Targeted Range  Outcome: Progressing     Problem: Sepsis/Septic Shock  Goal: Optimal Coping  Outcome: Progressing  Goal: Absence of Bleeding  Outcome: Progressing  Goal: Blood Glucose Level Within Targeted Range  Outcome: Progressing  Goal: Absence of Infection Signs and Symptoms  Outcome: Progressing  Goal: Optimal Nutrition Intake  Outcome: Progressing     Problem: COPD (Chronic Obstructive Pulmonary Disease)  Goal: Optimal Chronic Illness Coping  Outcome: Progressing  Goal: Optimal Level of Functional Kin[place]  Outcome: Progressing  Goal: Absence of Infection Signs and Symptoms  Outcome: Progressing  Goal: Improved Oral Intake  Outcome: Progressing  Goal: Effective Oxygenation and Ventilation  Outcome: Progressing

## 2025-07-16 NOTE — PROGRESS NOTES
[facility] - Telemetry ([facility])  Pulmonology  Progress Note    Patient Name: Yazan Hathaway  MRN: [id number]  Admission Date: 7/15/2025  Hospital Length of Stay: 0 days  Code Status: Full Code  Attending Provider: David Pike MD  Primary Care Provider: Simi Johnson MD   Principal Problem: Acute hypoxic respiratory failure    Subjective:   50 yr old with asthma and smoking hx presents with SOB. We are being consulted for a pulmonary nodule RUL. Per chart review, patient still ppd smoker. Recently saw allergist for asthmatic type symptoms and was told he likely has COPD. Patient was referred to pulmonology and had appointment in August. Patient was also scheduled for routine CT chest but did not get completed. Recently prescribed albuterol with no relief. Describes worsening sob and wheezing with activity and also lying flat. CT chest with moderate to severe emphysema and RUL spiculated pulmonary nodule measuring 1cm. Patient was started on treatment for COPD exacerbation including nebs, steroids, abx, supplemental O2.     7/16/2025: Patient on 1L NC. Looks comfortable upon entering room but gets mildly tachypnea when awakening. Complains of productive cough and worsening sob with coughing fits/lying flat.     Objective:     Vital Signs (Most Recent):  Temp: 98.2 °F (36.8 °C) (07/16/25 1554)  Pulse: 82 (07/16/25 1554)  Resp: 17 (07/16/25 1554)  BP: 128/70 (07/16/25 1554)  SpO2: (!) 92 % (07/16/25 1554) Vital Signs (24h Range):  Temp:  [97.5 °F (36.4 °C)-99.9 °F (37.7 °C)] 98.2 °F (36.8 °C)  Pulse:  [65-91] 82  Resp:  [17-24] 17  SpO2:  [91 %-98 %] 92 %  BP: (108-129)/(66-78) 128/70     Weight: 65.9 kg (145 lb 4.5 oz)  Body mass index is 28.37 kg/m².  Intake/Output Summary (Last 24 hours) at 7/16/2025 1611  Last data filed at 7/16/2025 0837  Gross per 24 hour   Intake 240 ml   Output 500 ml   Net -260 ml     Physical Exam  Constitutional:       General: He is not in acute distress.  HENT:      Head:  Normocephalic.      Mouth/Throat:      Mouth: Mucous membranes are moist.   Eyes:      Conjunctiva/sclera: Conjunctivae normal.      Pupils: Pupils are equal, round, and reactive to light.   Cardiovascular:      Pulses: Normal pulses.   Pulmonary:      Effort: Tachypnea present. No prolonged expiration.      Breath sounds: Rales present. No wheezing.      Comments: 1L NC  Abdominal:      General: There is no distension.      Palpations: Abdomen is soft.      Tenderness: There is no abdominal tenderness.   Musculoskeletal:      Right lower leg: No edema.      Left lower leg: No edema.   Skin:     General: Skin is warm.      Capillary Refill: Capillary refill takes less than 2 seconds.      Coloration: Skin is not jaundiced.      Findings: No bruising.   Neurological:      Mental Status: He is alert and oriented to person, place, and time.     Review of Systems   Constitutional:  Negative for chills, fatigue and fever.   Respiratory:  Positive for cough and shortness of breath. Negative for chest tightness.    Cardiovascular:  Negative for chest pain.   Gastrointestinal:  Negative for abdominal pain, nausea and vomiting.   Genitourinary:  Negative for dysuria.   Neurological:  Negative for dizziness and headaches.   Psychiatric/Behavioral:  Negative for agitation and confusion.      Vents:  Oxygen Concentration (%): 24 (07/16/25 1315)    Lines/Drains/Airways       Peripheral Intravenous Line  Duration             Peripheral IV Single Lumen 07/15/25 1438 20 G Anterior;Left Forearm 1 day    Peripheral IV Single Lumen 07/15/25 1440 20 G 1 in Anterior;Right Forearm 1 day                  Significant Labs:    CBC/Anemia Profile:  Recent Labs   Lab 07/15/25  1351 07/16/25  0415   WBC 7.21 4.63   HGB 15.8 15.7   HCT 48.0 49.0   * 157   MCV 88 90   RDW 14.1 13.9     Chemistries:  Recent Labs   Lab 07/15/25  1351 07/16/25  0415    137   K 3.7 4.8    104   CO2 21* 21*   BUN 13 13   CREATININE 0.6 0.7    CALCIUM 8.7 9.2   ALBUMIN 3.5  --    PROT 7.4  --    BILITOT 0.3  --    ALKPHOS 66  --    ALT 40  --    AST 24  --    MG 1.8 2.1   PHOS  --  4.2     Significant Imaging:  I have reviewed all pertinent imaging results/findings within the past 24 hours.  I have reviewed and interpreted all pertinent imaging results/findings within the past 24 hours.    ABG  Recent Labs   Lab 07/15/25  1846   PH 7.371   PO2 110*   PCO2 44.6   HCO3 25.9   BE 1     Assessment & Plan  Acute hypoxic respiratory failure  COPD exacerbation  - Patient with Hypoxic Respiratory failure which is Acute. He is not on home oxygen. Supplemental oxygen was provided and noted- Oxygen Concentration (%):  [24-28] 24  - Signs/symptoms of respiratory failure include: tachypnea, increased work of breathing, respiratory distress, and use of accessory muscles.   - Contributing diagnoses includes - COPD Labs and images were reviewed. Patient Has not had a recent ABG.   - Agree with hospitalist orders  - Continue with supplemental O2 as needed, currently 1L  - Still complains of sob with cough/lying flat   - No wheezing currently on exam  - Continue with LABA/ICS + prn nebs  - Recheck echo  - Productive cough, order sputum cx; continue empiric abx for now  - OOB as able, IS   Pulmonary nodule 1 cm or greater in diameter  - In AECOPD, not candidate for intervention at this moment  - Will need pulm establishment and follow up in clinic for further testing and plan of action.      Waqas Coon PA-C  Pulmonology  [facility] - Telemetry ([facility])

## 2025-07-16 NOTE — ASSESSMENT & PLAN NOTE
- In AECOPD, not candidate for intervention at this moment  - Will need pulm establishment and follow up in clinic for further testing and plan of action.

## 2025-07-16 NOTE — SUBJECTIVE & OBJECTIVE
Objective:     Vital Signs (Most Recent):  Temp: 98.2 °F (36.8 °C) (07/16/25 1554)  Pulse: 82 (07/16/25 1554)  Resp: 17 (07/16/25 1554)  BP: 128/70 (07/16/25 1554)  SpO2: (!) 92 % (07/16/25 1554) Vital Signs (24h Range):  Temp:  [97.5 °F (36.4 °C)-99.9 °F (37.7 °C)] 98.2 °F (36.8 °C)  Pulse:  [65-91] 82  Resp:  [17-24] 17  SpO2:  [91 %-98 %] 92 %  BP: (108-129)/(66-78) 128/70     Weight: 65.9 kg (145 lb 4.5 oz)  Body mass index is 28.37 kg/m².  Intake/Output Summary (Last 24 hours) at 7/16/2025 1611  Last data filed at 7/16/2025 0837  Gross per 24 hour   Intake 240 ml   Output 500 ml   Net -260 ml     Physical Exam  Constitutional:       General: He is not in acute distress.  HENT:      Head: Normocephalic.      Mouth/Throat:      Mouth: Mucous membranes are moist.   Eyes:      Conjunctiva/sclera: Conjunctivae normal.      Pupils: Pupils are equal, round, and reactive to light.   Cardiovascular:      Pulses: Normal pulses.   Pulmonary:      Effort: Tachypnea present. No prolonged expiration.      Breath sounds: Rales present. No wheezing.      Comments: 1L NC  Abdominal:      General: There is no distension.      Palpations: Abdomen is soft.      Tenderness: There is no abdominal tenderness.   Musculoskeletal:      Right lower leg: No edema.      Left lower leg: No edema.   Skin:     General: Skin is warm.      Capillary Refill: Capillary refill takes less than 2 seconds.      Coloration: Skin is not jaundiced.      Findings: No bruising.   Neurological:      Mental Status: He is alert and oriented to person, place, and time.     Review of Systems   Constitutional:  Negative for chills, fatigue and fever.   Respiratory:  Positive for cough and shortness of breath. Negative for chest tightness.    Cardiovascular:  Negative for chest pain.   Gastrointestinal:  Negative for abdominal pain, nausea and vomiting.   Genitourinary:  Negative for dysuria.   Neurological:  Negative for dizziness and headaches.    Psychiatric/Behavioral:  Negative for agitation and confusion.      Vents:  Oxygen Concentration (%): 24 (07/16/25 1315)    Lines/Drains/Airways       Peripheral Intravenous Line  Duration             Peripheral IV Single Lumen 07/15/25 1438 20 G Anterior;Left Forearm 1 day    Peripheral IV Single Lumen 07/15/25 1440 20 G 1 in Anterior;Right Forearm 1 day                  Significant Labs:    CBC/Anemia Profile:  Recent Labs   Lab 07/15/25  1351 07/16/25  0415   WBC 7.21 4.63   HGB 15.8 15.7   HCT 48.0 49.0   * 157   MCV 88 90   RDW 14.1 13.9     Chemistries:  Recent Labs   Lab 07/15/25  1351 07/16/25  0415    137   K 3.7 4.8    104   CO2 21* 21*   BUN 13 13   CREATININE 0.6 0.7   CALCIUM 8.7 9.2   ALBUMIN 3.5  --    PROT 7.4  --    BILITOT 0.3  --    ALKPHOS 66  --    ALT 40  --    AST 24  --    MG 1.8 2.1   PHOS  --  4.2     Significant Imaging:  I have reviewed all pertinent imaging results/findings within the past 24 hours.  I have reviewed and interpreted all pertinent imaging results/findings within the past 24 hours.

## 2025-07-16 NOTE — HOSPITAL COURSE
Patient admitted with new diagnosis of emphysema and pulmonary nodule.  He is still requiring nasal cannula oxygen to maintain sats greater than 88%.  He has been weaned down to 1 L at this time but is tachypneic and has increase work of breathing.  Patient was given 1 dose of xanax as he appears to have an anxiety component effecting his breathing as well.  Will check home o2 eval in AM if patient is unable to wean to room air in the morning.  Patient was seen by pulmonology for pulmonary nodule for which he recommended follow up in clinic therefore referral will be place.  Pulmonary recommended repeat chest x-ray that did not show any volume in the lungs but given his significant shortness a breath they recommended 1 dose of Lasix as well as increasing the steroids to 60 IV b.i.d..  Home O2 eval completed and patient is noted to need 4 L nasal cannula oxygen with ambulation.  Patient was doing much better on day of discharge and we will plan for a tapering dose of steroids.  He has also been counseled on following up with [facility] pulmonology for which she has an appointment in August.  Patient is stable for discharge at this time.  Case was also cleared by pulmonology for discharge.

## 2025-07-16 NOTE — PLAN OF CARE
POC reviewed with pt. Pt verbalizes understanding of POC. No questions at this time.  AAOx3. NADN.  NSR on cardiac monitor.  SOB. 2.5 L O2 NC.   IV ABX.   Pt remains free of falls.  No complaints at this time.  Safety measures in place. Will continue to monitor.  Informed pt to call for assistance before getting up. Pt verbalizes understanding.  Hourly rounding and chart check complete.

## 2025-07-16 NOTE — CONSULTS
Diabetes Education Consult    Consult received. The medical records were reviewed.    Current admission diagnosis: Acute respiratory failure        Labs:    A1C: 6/3/25 6.5      Glucose   Date Value Ref Range Status   07/16/2025 160 (H) 70 - 110 mg/dL Final         Met with pt for diabetes management practices. Pt reports history of pre-diabetes. Reviewed current A1C and target. Reviewed disease process and medical complications associated with uncontrolled diabetes. Encouraged lifestyle management to include weight loss, increased activity, and appropriate diet as a part of diabetes management.    Current home medications include: None. Pt currently on steroids for respiratory failure. Pt has PRN orders for SSI. Per MAR, pt has has been refusing insulin. Discussed the impact of steroids on BG and the reasoning for insulin while in hospital.     Pt does not have glucometer at home. Provided sample glucometer and training with pt. Pt demonstrated learning via return demonstration. Ordered glucometer and supplies at discharge. Provided log with recommended testing times and expected results. Discussed S/S of hypoglycemia. Reviewed appropriate treatment options. Reviewed S/S of hyperglycemia. Discussed when to test for ketones: BS>250, as well as S/S of DKA. Reviewed ketone results and when to seek medical help.    Discussed carbohydrate containing foods, 45-60 grams carbohydrate choice meal plan using pictures; serving sizes, Plate Method, and label reading. Recommended lean protein and healthy fat with meals and snacks. Encouraged to avoid obvious sources of sugar.    Reviewed the diabetes care checklist and the importance of regular follow up with physician. Left diabetes education packet for reference and contact information. Encouraged pt to contact diabetes education team with any questions or concerns.      Please consult as needed.  Thank You!  Rossy Sherwood RN  Diabetes Education

## 2025-07-16 NOTE — ASSESSMENT & PLAN NOTE
-patient has a 1 pack per day history of smoking   -pulmonary consulted for possible need for tissue biopsy.  Recommended outpatient follow up.  -patient does have an appointment in 1 month to establish care with pulmonology   -Wife inquired about a sooner appt with pulm therefore referral sent through [facility].

## 2025-07-16 NOTE — HPI
50 yr old with asthma and smoking hx presents with SOB. We are being consulted for a pulmonary nodule RUL. Per chart review, patient still ppd smoker. Recently saw allergist for asthmatic type symptoms and was told he likely has COPD. Patient was referred to pulmonology and had appointment in August. Patient was also scheduled for routine CT chest but did not get completed. Recently prescribed albuterol with no relief. Describes worsening sob and wheezing with activity and also lying flat. CT chest with moderate to severe emphysema and RUL spiculated pulmonary nodule measuring 1cm. Patient was started on treatment for COPD exacerbation including nebs, steroids, abx, supplemental O2.

## 2025-07-16 NOTE — SUBJECTIVE & OBJECTIVE
Interval History:  Follow up emphysema and pulmonary nodule.  Patient is continued on steroids, empiric antibiotics, and breathing treatments.  He has been weaned to 1 L nasal cannula O2, satting to>90%    Review of Systems  Objective:     Vital Signs (Most Recent):  Temp: 98 °F (36.7 °C) (07/16/25 1152)  Pulse: 86 (07/16/25 1315)  Resp: 20 (07/16/25 1315)  BP: 120/74 (07/16/25 1152)  SpO2: (!) 93 % (07/16/25 1315) Vital Signs (24h Range):  Temp:  [97.5 °F (36.4 °C)-99.9 °F (37.7 °C)] 98 °F (36.7 °C)  Pulse:  [65-97] 86  Resp:  [17-24] 20  SpO2:  [91 %-98 %] 93 %  BP: (108-133)/(66-78) 120/74     Weight: 65.9 kg (145 lb 4.5 oz)  Body mass index is 28.37 kg/m².    Intake/Output Summary (Last 24 hours) at 7/16/2025 1531  Last data filed at 7/16/2025 0837  Gross per 24 hour   Intake 240 ml   Output 500 ml   Net -260 ml         Physical Exam  Vitals and nursing note reviewed.   Constitutional:       Appearance: Normal appearance.   HENT:      Head: Normocephalic and atraumatic.      Nose: Nose normal.      Mouth/Throat:      Mouth: Mucous membranes are moist.   Eyes:      Extraocular Movements: Extraocular movements intact.      Conjunctiva/sclera: Conjunctivae normal.   Cardiovascular:      Rate and Rhythm: Normal rate and regular rhythm.      Pulses: Normal pulses.      Heart sounds: Normal heart sounds.   Pulmonary:      Effort: Pulmonary effort is normal.      Breath sounds: Normal breath sounds.      Comments: Muffled breath sounds bilaterally  Abdominal:      General: Abdomen is flat. Bowel sounds are normal.      Palpations: Abdomen is soft.   Musculoskeletal:         General: No swelling. Normal range of motion.      Cervical back: Normal range of motion and neck supple.   Skin:     General: Skin is warm.      Capillary Refill: Capillary refill takes less than 2 seconds.   Neurological:      Mental Status: He is alert and oriented to person, place, and time. Mental status is at baseline.   Psychiatric:          Mood and Affect: Mood normal.         Behavior: Behavior normal.               Significant Labs: All pertinent labs within the past 24 hours have been reviewed.  Recent Lab Results  (Last 5 results in the past 24 hours)        07/16/25  1146   07/16/25  0512   07/16/25  0415   07/15/25  2033   07/15/25  1846        Allens Test         Pass       Anion Gap     12           Baso #     0.01           Basophil %     0.2           Site         RR       BUN     13           Calcium     9.2           Chloride     104           CO2     21           Creatinine     0.7           DelSys         Nasal Can       eGFR     >60  Comment: Estimated GFR calculated using the CKD-EPI creatinine (2021) equation.           Eos #     0.00           Eos %     0.0           Flow         3       Glucose     160           Gran # (ANC)     3.63           Hematocrit     49.0           Hemoglobin     15.7           Immature Grans (Abs)     0.02  Comment: Mild elevation in immature granulocytes is non specific and can be seen in a variety of conditions including stress response, acute inflammation, trauma and pregnancy. Correlation with other laboratory and clinical findings is essential.           Immature Granulocytes     0.4           Lymph #     0.71           Lymph %     15.3           Magnesium      2.1           MCH     28.7           MCHC     32.0           MCV     90           Mode         SPONT       Mono #     0.26           Mono %     5.6           MPV     10.4           Neut %     78.5           nRBC     0           Phosphorus Level     4.2           Platelet Count     157           POC BE         1       POC HCO3         25.9       POC PCO2         44.6       POC PH         7.371       POC PO2         110       POC SATURATED O2         98       POCT Glucose 161   173     214         Potassium     4.8           RBC     5.47           RDW     13.9           Sample         ARTERIAL       Sodium     137           WBC     4.63                                   Significant Imaging: I have reviewed all pertinent imaging results/findings within the past 24 hours.

## 2025-07-17 ENCOUNTER — TELEPHONE (OUTPATIENT)
Dept: PULMONOLOGY | Facility: CLINIC | Age: 50
End: 2025-07-17
Payer: MEDICAID

## 2025-07-17 LAB
ABSOLUTE EOSINOPHIL (OHS): 0 K/UL
ABSOLUTE MONOCYTE (OHS): 0.6 K/UL (ref 0.3–1)
ABSOLUTE NEUTROPHIL COUNT (OHS): 6.94 K/UL (ref 1.8–7.7)
ANION GAP (OHS): 12 MMOL/L (ref 8–16)
AORTIC ROOT ANNULUS: 3.4 CM
AV INDEX (PROSTH): 0.82
AV MEAN GRADIENT: 9 MMHG
AV PEAK GRADIENT: 14 MMHG
AV VALVE AREA BY VELOCITY RATIO: 3 CM²
AV VALVE AREA: 3.1 CM²
AV VELOCITY RATIO: 0.79
BASOPHILS # BLD AUTO: 0.01 K/UL
BASOPHILS NFR BLD AUTO: 0.1 %
BSA FOR ECHO PROCEDURE: 1.65 M2
BUN SERPL-MCNC: 20 MG/DL (ref 6–20)
CALCIUM SERPL-MCNC: 9.4 MG/DL (ref 8.7–10.5)
CHLORIDE SERPL-SCNC: 102 MMOL/L (ref 95–110)
CO2 SERPL-SCNC: 24 MMOL/L (ref 23–29)
CREAT SERPL-MCNC: 0.7 MG/DL (ref 0.5–1.4)
CV ECHO LV RWT: 0.49 CM
DOP CALC AO PEAK VEL: 1.9 M/S
DOP CALC AO VTI: 37.9 CM
DOP CALC LVOT AREA: 3.8 CM2
DOP CALC LVOT DIAMETER: 2.2 CM
DOP CALC LVOT PEAK VEL: 1.5 M/S
DOP CALC LVOT STROKE VOLUME: 118.5 CM3
DOP CALC MV VTI: 25.6 CM
DOP CALC RVOT PEAK VEL: 1.24 M/S
DOP CALC RVOT VTI: 25.6 CM
DOP CALCLVOT PEAK VEL VTI: 31.2 CM
E WAVE DECELERATION TIME: 163 MSEC
E/A RATIO: 0.91
E/E' RATIO: 8 M/S
ECHO LV POSTERIOR WALL: 1 CM (ref 0.6–1.1)
EJECTION FRACTION: 60 %
ERYTHROCYTE [DISTWIDTH] IN BLOOD BY AUTOMATED COUNT: 13.9 % (ref 11.5–14.5)
FRACTIONAL SHORTENING: 36.6 % (ref 28–44)
GFR SERPLBLD CREATININE-BSD FMLA CKD-EPI: >60 ML/MIN/1.73/M2
GLUCOSE SERPL-MCNC: 146 MG/DL (ref 70–110)
HCT VFR BLD AUTO: 50.8 % (ref 40–54)
HGB BLD-MCNC: 16.6 GM/DL (ref 14–18)
IMM GRANULOCYTES # BLD AUTO: 0.03 K/UL (ref 0–0.04)
IMM GRANULOCYTES NFR BLD AUTO: 0.4 % (ref 0–0.5)
INTERVENTRICULAR SEPTUM: 1 CM (ref 0.6–1.1)
IVC DIAMETER: 1.81 CM
LA MAJOR: 4.4 CM
LA MINOR: 4.3 CM
LA WIDTH: 3.2 CM
LEFT ATRIUM AREA SYSTOLIC (APICAL 4 CHAMBER): 12.41 CM2
LEFT ATRIUM SIZE: 2.8 CM
LEFT ATRIUM VOLUME INDEX: 21 ML/M2
LEFT ATRIUM VOLUME: 33 CM3
LEFT INTERNAL DIMENSION IN SYSTOLE: 2.6 CM (ref 2.1–4)
LEFT VENTRICLE DIASTOLIC VOLUME INDEX: 46.58 ML/M2
LEFT VENTRICLE DIASTOLIC VOLUME: 75 ML
LEFT VENTRICLE END SYSTOLIC VOLUME APICAL 4 CHAMBER: 26.36 ML
LEFT VENTRICLE MASS INDEX: 82.1 G/M2
LEFT VENTRICLE SYSTOLIC VOLUME INDEX: 15.5 ML/M2
LEFT VENTRICLE SYSTOLIC VOLUME: 25 ML
LEFT VENTRICULAR INTERNAL DIMENSION IN DIASTOLE: 4.1 CM (ref 3.5–6)
LEFT VENTRICULAR MASS: 132.1 G
LV LATERAL E/E' RATIO: 8.6 M/S
LV SEPTAL E/E' RATIO: 7.8 M/S
LVED V (TEICH): 75.15 ML
LVES V (TEICH): 25.12 ML
LVOT MG: 5.34 MMHG
LVOT MV: 1.09 CM/S
LYMPHOCYTES # BLD AUTO: 0.7 K/UL (ref 1–4.8)
MAGNESIUM SERPL-MCNC: 2.1 MG/DL (ref 1.6–2.6)
MCH RBC QN AUTO: 29 PG (ref 27–31)
MCHC RBC AUTO-ENTMCNC: 32.7 G/DL (ref 32–36)
MCV RBC AUTO: 89 FL (ref 82–98)
MV MEAN GRADIENT: 1 MMHG
MV PEAK A VEL: 0.94 M/S
MV PEAK E VEL: 0.86 M/S
MV PEAK GRADIENT: 4 MMHG
MV STENOSIS PRESSURE HALF TIME: 56.06 MS
MV VALVE AREA BY CONTINUITY EQUATION: 4.63 CM2
MV VALVE AREA P 1/2 METHOD: 3.92 CM2
NUCLEATED RBC (/100WBC) (OHS): 0 /100 WBC
OHS CV CPX PATIENT HEIGHT IN: 60
OHS CV RV/LV RATIO: 0.73 CM
PHOSPHATE SERPL-MCNC: 3.7 MG/DL (ref 2.7–4.5)
PLATELET # BLD AUTO: 174 K/UL (ref 150–450)
PMV BLD AUTO: 10.3 FL (ref 9.2–12.9)
POCT GLUCOSE: 139 MG/DL (ref 70–110)
POCT GLUCOSE: 143 MG/DL (ref 70–110)
POCT GLUCOSE: 166 MG/DL (ref 70–110)
POCT GLUCOSE: 171 MG/DL (ref 70–110)
POTASSIUM SERPL-SCNC: 4.1 MMOL/L (ref 3.5–5.1)
PV MEAN GRADIENT: 4 MMHG
PV MV: 0.9 M/S
PV PEAK GRADIENT: 6 MMHG
PV PEAK VELOCITY: 1.26 M/S
RA MAJOR: 4.26 CM
RA PRESSURE ESTIMATED: 3 MMHG
RA WIDTH: 3 CM
RBC # BLD AUTO: 5.72 M/UL (ref 4.6–6.2)
RELATIVE EOSINOPHIL (OHS): 0 %
RELATIVE LYMPHOCYTE (OHS): 8.5 % (ref 18–48)
RELATIVE MONOCYTE (OHS): 7.2 % (ref 4–15)
RELATIVE NEUTROPHIL (OHS): 83.8 % (ref 38–73)
RIGHT VENTRICLE DIASTOLIC BASEL DIMENSION: 3 CM
RIGHT VENTRICULAR END-DIASTOLIC DIMENSION: 2.95 CM
RV TISSUE DOPPLER FREE WALL SYSTOLIC VELOCITY 1 (APICAL 4 CHAMBER VIEW): 13.58 CM/S
SODIUM SERPL-SCNC: 138 MMOL/L (ref 136–145)
TDI LATERAL: 0.1 M/S
TDI SEPTAL: 0.11 M/S
TDI: 0.11 M/S
TRICUSPID ANNULAR PLANE SYSTOLIC EXCURSION: 2.8 CM
WBC # BLD AUTO: 8.28 K/UL (ref 3.9–12.7)
Z-SCORE OF LEFT VENTRICULAR DIMENSION IN END DIASTOLE: -1.08
Z-SCORE OF LEFT VENTRICULAR DIMENSION IN END SYSTOLE: -0.68

## 2025-07-17 PROCEDURE — 94799 UNLISTED PULMONARY SVC/PX: CPT | Mod: XB

## 2025-07-17 PROCEDURE — S4991 NICOTINE PATCH NONLEGEND: HCPCS | Performed by: FAMILY MEDICINE

## 2025-07-17 PROCEDURE — 27000221 HC OXYGEN, UP TO 24 HOURS

## 2025-07-17 PROCEDURE — 84100 ASSAY OF PHOSPHORUS: CPT | Performed by: FAMILY MEDICINE

## 2025-07-17 PROCEDURE — 94799 UNLISTED PULMONARY SVC/PX: CPT

## 2025-07-17 PROCEDURE — 94618 PULMONARY STRESS TESTING: CPT

## 2025-07-17 PROCEDURE — 25000242 PHARM REV CODE 250 ALT 637 W/ HCPCS: Performed by: FAMILY MEDICINE

## 2025-07-17 PROCEDURE — 83735 ASSAY OF MAGNESIUM: CPT | Performed by: FAMILY MEDICINE

## 2025-07-17 PROCEDURE — 84520 ASSAY OF UREA NITROGEN: CPT | Performed by: FAMILY MEDICINE

## 2025-07-17 PROCEDURE — 36415 COLL VENOUS BLD VENIPUNCTURE: CPT | Performed by: FAMILY MEDICINE

## 2025-07-17 PROCEDURE — 21400001 HC TELEMETRY ROOM

## 2025-07-17 PROCEDURE — 25000003 PHARM REV CODE 250: Performed by: FAMILY MEDICINE

## 2025-07-17 PROCEDURE — 85025 COMPLETE CBC W/AUTO DIFF WBC: CPT | Performed by: FAMILY MEDICINE

## 2025-07-17 PROCEDURE — 94640 AIRWAY INHALATION TREATMENT: CPT

## 2025-07-17 PROCEDURE — 99900035 HC TECH TIME PER 15 MIN (STAT)

## 2025-07-17 PROCEDURE — 63700000 PHARM REV CODE 250 ALT 637 W/O HCPCS: Performed by: FAMILY MEDICINE

## 2025-07-17 PROCEDURE — 63600175 PHARM REV CODE 636 W HCPCS: Performed by: FAMILY MEDICINE

## 2025-07-17 PROCEDURE — 94761 N-INVAS EAR/PLS OXIMETRY MLT: CPT

## 2025-07-17 RX ORDER — FUROSEMIDE 10 MG/ML
20 INJECTION INTRAMUSCULAR; INTRAVENOUS ONCE
Status: COMPLETED | OUTPATIENT
Start: 2025-07-17 | End: 2025-07-17

## 2025-07-17 RX ADMIN — NICOTINE 1 PATCH: 21 PATCH, EXTENDED RELEASE TRANSDERMAL at 10:07

## 2025-07-17 RX ADMIN — METHYLPREDNISOLONE SODIUM SUCCINATE 40 MG: 40 INJECTION, POWDER, FOR SOLUTION INTRAMUSCULAR; INTRAVENOUS at 02:07

## 2025-07-17 RX ADMIN — BUDESONIDE INHALATION 0.5 MG: 0.5 SUSPENSION RESPIRATORY (INHALATION) at 08:07

## 2025-07-17 RX ADMIN — ATORVASTATIN CALCIUM 10 MG: 10 TABLET, FILM COATED ORAL at 09:07

## 2025-07-17 RX ADMIN — FUROSEMIDE 20 MG: 10 INJECTION, SOLUTION INTRAMUSCULAR; INTRAVENOUS at 03:07

## 2025-07-17 RX ADMIN — BUDESONIDE INHALATION 0.5 MG: 0.5 SUSPENSION RESPIRATORY (INHALATION) at 07:07

## 2025-07-17 RX ADMIN — AZITHROMYCIN DIHYDRATE 250 MG: 250 TABLET ORAL at 10:07

## 2025-07-17 RX ADMIN — METHYLPREDNISOLONE SODIUM SUCCINATE 40 MG: 40 INJECTION, POWDER, FOR SOLUTION INTRAMUSCULAR; INTRAVENOUS at 04:07

## 2025-07-17 RX ADMIN — GUAIFENESIN AND DEXTROMETHORPHAN 10 ML: 100; 10 SYRUP ORAL at 09:07

## 2025-07-17 RX ADMIN — ARFORMOTEROL TARTRATE 15 MCG: 15 SOLUTION RESPIRATORY (INHALATION) at 08:07

## 2025-07-17 RX ADMIN — ARFORMOTEROL TARTRATE 15 MCG: 15 SOLUTION RESPIRATORY (INHALATION) at 07:07

## 2025-07-17 RX ADMIN — CEFTRIAXONE 1 G: 1 INJECTION, POWDER, FOR SOLUTION INTRAMUSCULAR; INTRAVENOUS at 10:07

## 2025-07-17 RX ADMIN — METHYLPREDNISOLONE SODIUM SUCCINATE 60 MG: 40 INJECTION, POWDER, FOR SOLUTION INTRAMUSCULAR; INTRAVENOUS at 09:07

## 2025-07-17 RX ADMIN — GUAIFENESIN AND DEXTROMETHORPHAN 5 ML: 100; 10 SYRUP ORAL at 10:07

## 2025-07-17 RX ADMIN — GUAIFENESIN AND DEXTROMETHORPHAN 5 ML: 100; 10 SYRUP ORAL at 05:07

## 2025-07-17 NOTE — ASSESSMENT & PLAN NOTE
- Patient with Hypoxic Respiratory failure which is Acute. He is not on home oxygen. Supplemental oxygen was provided and noted- Oxygen Concentration (%):  [24-32] 32  - Signs/symptoms of respiratory failure include: tachypnea, increased work of breathing, respiratory distress, and use of accessory muscles.   - Contributing diagnoses includes - COPD Labs and images were reviewed. Patient Has not had a recent ABG.   - Agree with hospitalist orders  - Continue with supplemental O2 as needed, currently 1L  - Still complains of sob with cough/lying flat   - No wheezing currently on exam  - Continue with LABA/ICS + prn nebs  - Recheck echo  - Productive cough, order sputum cx; continue empiric abx for now  - OOB as able, IS   - 7/17: Patient oxygenation is okay but still with some intermittent wob/tachypnea. Repeat echo wnl. Some crackles at bases but otherwise no wheezing, good air movement. Repeat CXR with no changes. Sputum culture is pending. Continue to follow and continue abx for now. Can increase dose solu-medrol, +/- lasix and monitor response. Home O2 eval has been completed. [facility] pulmonology referral has been made.

## 2025-07-17 NOTE — RESPIRATORY THERAPY
Home Oxygen Evaluation - [facility] - Cardiopulmonary Department      Date Performed: 7/17/2025      1) Patient's Home O2 Sat on room air, while at rest: Room Air SpO2 At Rest: 90 %        If O2 sats on room air at rest are 88% or below, patient qualifies.  Document O2 liter flow needed in Step 2.  If O2 sats are 89% or above, complete Step 3.        2)  If patient is not ambulated and O2 sats are <88%, what is the O2 liter flow required to meet ordered saturation?      If O2 sats on room air while exercising remain 89% or above patient does not qualify, no further testing needed Document N/A in step 3. If O2 sats on room air while exercising are 88% or below, continue to Step 4.    3) Patient's O2 Sat on room air while exercising: Room Air SpO2 During Ambulation: (!) 84 %        4) Patient's O2 Sat while exercising on O2: SpO2 During Ambulation on O2: 94 % at Ambulation O2 LPM: 4 LPM         (Must show improvement from #4 for patients to qualify)

## 2025-07-17 NOTE — SUBJECTIVE & OBJECTIVE
Objective:     Vital Signs (Most Recent):  Temp: 98.1 °F (36.7 °C) (07/17/25 1527)  Pulse: 83 (07/17/25 1527)  Resp: 18 (07/17/25 1527)  BP: 119/78 (07/17/25 1527)  SpO2: 96 % (07/17/25 1527) Vital Signs (24h Range):  Temp:  [98.1 °F (36.7 °C)-98.6 °F (37 °C)] 98.1 °F (36.7 °C)  Pulse:  [71-93] 83  Resp:  [15-20] 18  SpO2:  [92 %-98 %] 96 %  BP: (116-149)/(75-91) 119/78     Weight: 64.1 kg (141 lb 5 oz)  Body mass index is 27.6 kg/m².  Intake/Output Summary (Last 24 hours) at 7/17/2025 1613  Last data filed at 7/17/2025 1442  Gross per 24 hour   Intake 680 ml   Output --   Net 680 ml     Physical Exam  Constitutional:       General: He is not in acute distress.  HENT:      Head: Normocephalic.      Nose: Nose normal.   Eyes:      Conjunctiva/sclera: Conjunctivae normal.      Pupils: Pupils are equal, round, and reactive to light.   Cardiovascular:      Pulses: Normal pulses.   Pulmonary:      Effort: Tachypnea (at times during exam) present.      Breath sounds: Rales (at bases) present. No wheezing.      Comments: 3L on exam  Musculoskeletal:         General: No swelling. Normal range of motion.   Skin:     General: Skin is warm.      Capillary Refill: Capillary refill takes less than 2 seconds.      Coloration: Skin is not jaundiced.      Findings: No bruising.   Neurological:      Mental Status: He is alert and oriented to person, place, and time.   Psychiatric:         Mood and Affect: Mood normal.         Behavior: Behavior normal.     Review of Systems   Constitutional:  Negative for chills, fatigue and fever.   Respiratory:  Positive for cough and shortness of breath. Negative for chest tightness.    Cardiovascular:  Negative for chest pain.   Gastrointestinal:  Negative for abdominal pain, nausea and vomiting.   Genitourinary:  Negative for dysuria.   Neurological:  Negative for dizziness and headaches.   Psychiatric/Behavioral:  Negative for agitation and confusion.      Vents:  Oxygen Concentration (%):  32 (07/17/25 0800)    Lines/Drains/Airways       Peripheral Intravenous Line  Duration             Peripheral IV Single Lumen 07/15/25 1438 20 G Anterior;Left Forearm 2 days    Peripheral IV Single Lumen 07/15/25 1440 20 G 1 in Anterior;Right Forearm 2 days                  Significant Labs:    CBC/Anemia Profile:  Recent Labs   Lab 07/16/25  0415 07/17/25  0429   WBC 4.63 8.28   HGB 15.7 16.6   HCT 49.0 50.8    174   MCV 90 89   RDW 13.9 13.9     Chemistries:  Recent Labs   Lab 07/16/25  0415 07/17/25  0429    138   K 4.8 4.1    102   CO2 21* 24   BUN 13 20   CREATININE 0.7 0.7   CALCIUM 9.2 9.4   MG 2.1 2.1   PHOS 4.2 3.7     Significant Imaging:  I have reviewed all pertinent imaging results/findings within the past 24 hours.  I have reviewed and interpreted all pertinent imaging results/findings within the past 24 hours.

## 2025-07-17 NOTE — ASSESSMENT & PLAN NOTE
Patient with Hypoxic Respiratory failure which is Acute on chronic.  he is not on home oxygen. Supplemental oxygen was provided and noted- Oxygen Concentration (%):  [24-32] 32    .   Signs/symptoms of respiratory failure include- tachypnea and increased work of breathing. Contributing diagnoses includes - COPD Labs and images were reviewed. Patient Has not had a recent ABG, but ordered. Will treat underlying causes and adjust management of respiratory failure as follows- steroids, DuoNebs, Trelegy, Pulmonary consult    -new diagnosis of COPD.  CT chest shows emphysema and pulmonary nodule.  -echocardiogram was considered but patient had a stress test 1 month ago that showed an EF of 65% and BNP <10  -repeat chest x-ray did not show any vascular congestion   -although chest x-ray was negative for volume 1 dose of Lasix was ordered as well as increase in steroids per recommendation of pulmonology.

## 2025-07-17 NOTE — PROGRESS NOTES
[facility] Medicine  Progress Note    Patient Name: Yazan Hathaway  MRN: [id number]  Patient Class: IP- Inpatient   Admission Date: 7/15/2025  Length of Stay: 1 days  Attending Physician: David Pike MD  Primary Care Provider: Simi Johnson MD        Subjective     Principal Problem:Acute hypoxic respiratory failure        HPI:  Mr. Hathaway is a 50-year-old male with past medical history of hyperlipidemia, asthma, diabetes, and tobacco abuse.  Patient admits to smoking 1 pack per day and was recently seen by a allergist who feels he likely has COPD for which the patient has been referred to pulmonology but follow up appointment is in 1 month.  He states his shortness a breath had started approximately 6 months ago but had worsened over the last 2 days hence prompting him to come to the ER.  Patient was noted to have a low-grade fever of 100.7 on arrival.  Labs on arrival are unremarkable and chest x-ray showed There is a poorly visualized 9 mm oval shaped density projected over the right upper lobe.  If additional imaging evaluation is clinically indicated, I recommend consideration of a CT examination of the thorax without IV contrast in 3 months.  CT chest was ordered and showed moderate to severe emphysema.  Worrisome spiculated right upper lobe pulmonary nodule, 1 cm.  Pulmonary is consulted for evaluation as patient is high-risk with a notable upper lobe nodule that may need to be biopsied.  Patient has been started on IV steroids and breathing treatments as well as continued inhaler.  An echocardiogram was considered but patient was noted to have had a stress test on 06/03/2025 which noted an EF of 65% and BNP is less than 10.  Hospital medicine will admit for observation for emphysema exacerbation.    Overview/Hospital Course:  Patient admitted with new diagnosis of emphysema and pulmonary nodule.  He is still requiring nasal cannula oxygen to maintain sats greater than  88%.  He has been weaned down to 1 L at this time but is tachypneic and has increase work of breathing.  Patient was given 1 dose of xanax as he appears to have an anxiety component effecting his breathing as well.  Will check home o2 eval in AM if patient is unable to wean to room air in the morning.  Patient was seen by pulmonology for pulmonary nodule for which he recommended follow up in clinic therefore referral will be place.  Pulmonary recommended repeat chest x-ray that did not show any volume in the lungs but given his significant shortness a breath they recommended 1 dose of Lasix as well as increasing the steroids to 60 IV b.i.d..  Home O2 eval completed and patient is noted to need 4 L nasal cannula oxygen with ambulation.    Interval History:  Follow up for COPD exacerbation.  Patient continues to have shortness a breath and qualified for home O2 at 4 L with ambulation.  Her appreciate Pulmonary assistance.    Review of Systems  Objective:     Vital Signs (Most Recent):  Temp: 98.1 °F (36.7 °C) (07/17/25 1527)  Pulse: 83 (07/17/25 1527)  Resp: 18 (07/17/25 1527)  BP: 119/78 (07/17/25 1527)  SpO2: 96 % (07/17/25 1527) Vital Signs (24h Range):  Temp:  [98.1 °F (36.7 °C)-98.6 °F (37 °C)] 98.1 °F (36.7 °C)  Pulse:  [71-93] 83  Resp:  [15-20] 18  SpO2:  [92 %-98 %] 96 %  BP: (116-149)/(70-91) 119/78     Weight: 64.1 kg (141 lb 5 oz)  Body mass index is 27.6 kg/m².    Intake/Output Summary (Last 24 hours) at 7/17/2025 1543  Last data filed at 7/17/2025 1442  Gross per 24 hour   Intake 680 ml   Output --   Net 680 ml         Physical Exam  Vitals and nursing note reviewed.   Constitutional:       Appearance: Normal appearance.   HENT:      Head: Normocephalic and atraumatic.      Nose: Nose normal.      Mouth/Throat:      Mouth: Mucous membranes are moist.   Eyes:      Extraocular Movements: Extraocular movements intact.      Conjunctiva/sclera: Conjunctivae normal.   Cardiovascular:      Rate and Rhythm:  Normal rate and regular rhythm.      Pulses: Normal pulses.      Heart sounds: Normal heart sounds.   Pulmonary:      Effort: Respiratory distress present.      Breath sounds: No wheezing or rales.      Comments: Distant breath sounds bilaterally  Abdominal:      General: Abdomen is flat. Bowel sounds are normal.      Palpations: Abdomen is soft.   Musculoskeletal:      Cervical back: Normal range of motion and neck supple.   Skin:     General: Skin is warm.      Capillary Refill: Capillary refill takes less than 2 seconds.   Neurological:      Mental Status: He is alert. Mental status is at baseline.   Psychiatric:         Mood and Affect: Mood normal.         Behavior: Behavior normal.               Significant Labs: All pertinent labs within the past 24 hours have been reviewed.  Recent Lab Results  (Last 5 results in the past 24 hours)        07/17/25  1130   07/17/25  0937   07/17/25  0458   07/17/25  0429   07/16/25  1648        Anion Gap       12         Ao root annulus   3.4             Ao peak radha   1.9             Ao VTI   37.9             AV valve area   3.1             ANGELA by Velocity Ratio   3.0             AV mean gradient   9             AV index (prosthetic)   0.82             AV peak gradient   14             AV Velocity Ratio   0.79             Baso #       0.01         Basophil %       0.1         BSA   1.65             BUN       20         Calcium       9.4         Chloride       102         CO2       24         Creatinine       0.7         Left Ventricle Relative Wall Thickness   0.49             E/A ratio   0.91             E/E' ratio   8             eGFR       >60  Comment: Estimated GFR calculated using the CKD-EPI creatinine (2021) equation.         EF   60             Eos #       0.00         Eos %       0.0         E wave deceleration time   163             FS   36.6             Glucose       146         Gran # (ANC)       6.94         Hematocrit       50.8         Hemoglobin       16.6          Immature Grans (Abs)       0.03  Comment: Mild elevation in immature granulocytes is non specific and can be seen in a variety of conditions including stress response, acute inflammation, trauma and pregnancy. Correlation with other laboratory and clinical findings is essential.         Immature Granulocytes       0.4         IVC diameter   1.81             IVSd   1.0             LA WIDTH   3.2             LA area A4C   12.41             Left Atrium Major Axis   4.4             Left Atrium Minor Axis   4.3             LA size   2.8             LA Vol   33             LA vol index   21             LVOT area   3.8             LV LATERAL E/E' RATIO   8.6             LV SEPTAL E/E' RATIO   7.8             LV EDV BP   75             LV Diastolic Volume Index   46.58             Left Ventricular End Diastolic Volume by Teichholz Method   75.15             Left Ventricular End Systolic Volume by Teichholz Method   25.12             LV ESV A4C   26.36             LVIDd   4.1             LVIDs   2.6             LV mass   132.1             LV Mass Index   82.1             Left Ventricular Outflow Tract Mean Gradient   5.34             Left Ventricular Outflow Tract Mean Velocity   1.09             LVOT diameter   2.2             LVOT peak luis a   1.5             LVOT stroke volume   118.5             LVOT peak VTI   31.2             LV ESV BP   25             LV Systolic Volume Index   15.5             Lymph #       0.70         Lymph %       8.5         Magnesium        2.1         MCH       29.0         MCHC       32.7         MCV       89         Mean e'   0.11             Mono #       0.60         Mono %       7.2         MPV       10.3         MV valve area p 1/2 method   3.92             MV valve area by continuity eq   4.63             MV mean gradient   1             MV peak gradient   4             MV Peak A Luis A   0.94             MV Peak E Luis A   0.86             MV stenosis pressure 1/2 time   56.06              MV VTI   25.6             Neut %       83.8         nRBC       0         OHS CV CPX PATIENT HEIGHT IN   60             Phosphorus Level       3.7         Platelet Count       174         POCT Glucose 139     166     170       Potassium       4.1         Pulmonary Valve Mean Velocity   0.90             PV mean gradient   4             PV peak gradient   6             PV PEAK VELOCITY   1.26             PW   1.0             RA Major Axis   4.26             Est. RA pres   3             RA Width   3.0             RBC       5.72         RDW       13.9         RV S'   13.58             RV/LV Ratio   0.73             RVDD   2.95             RV- villalta basal diam   3.0             RVOT peak radha   1.24             RVOT peak VTI   25.6             Sodium       138         TAPSE   2.8             TDI SEPTAL   0.11             TDI LATERAL   0.10             WBC       8.28         ZLVIDD   -1.08             ZLVIDS   -0.68                                    Significant Imaging:   X-Ray Chest 1 View   Final Result      No detrimental interval change or acute abnormality as above.         Electronically signed by: Saleem Harris   Date:    07/17/2025   Time:    13:53      CT Chest Without Contrast   Final Result    Moderate to severe emphysema.  Worrisome spiculated right upper lobe pulmonary nodule, 1 cm.  Recommend follow-up according to the Fleischner Society criteria.            Fleischner Society 2017 Guidelines - http://bit.ly/fleischner      Solid Nodules - Single      Low risk   Less than 6 mm - No routine follow-up   6-8 mm - CT at 6-12 months, then consider CT at 18-24 months   Greater than 8 mm - Consider CT at 3 months, PET/CT, or tissue sampling      Comments:   Nodules less than 6 mm do not require routine follow-up in low -risk patients      High risk   Less than 6 mm - Optional CT at 12 months   6-8 mm - CT at 6-12 months, then CT at 18-24 months   Greater than 8 mm - Consider CT at 3 months, PET/CT, or tissue  sampling      Comments:   Certain patients at high risk with suspicious nodule morphology, upper lobe location, or both may warrant 12-month follow-up                  All CT scans at [this location] are performed using dose modulation techniques as appropriate to a performed exam including the following:  Automated exposure control; adjustment of the mA and/or kV according to patient size (this includes techniques or standardized protocols for targeted exams where dose is matched to indication / reason for exam; i.e. extremities or head); use of iterative reconstruction technique.      Finalized on: 7/15/2025 4:50 PM By:  Khadar Rowe MD   [facility] [place]# [id number]      2025-07-15 16:52:[id number]     [facility] [place] [facility]      X-Ray Chest AP Portable   Final Result      There is a poorly visualized 9 mm oval shaped density projected over the right upper lobe.  If additional imaging evaluation is clinically indicated, I recommend consideration of a CT examination of the thorax without IV contrast in 3 months.         Electronically signed by: Cronel Lim MD   Date:    07/15/2025   Time:    13:59             Assessment & Plan  Acute hypoxic respiratory failure  Patient with Hypoxic Respiratory failure which is Acute on chronic.  he is not on home oxygen. Supplemental oxygen was provided and noted- Oxygen Concentration (%):  [24-32] 32    .   Signs/symptoms of respiratory failure include- tachypnea and increased work of breathing. Contributing diagnoses includes - COPD Labs and images were reviewed. Patient Has not had a recent ABG, but ordered. Will treat underlying causes and adjust management of respiratory failure as follows- steroids, DuoNebs, Trelegy, Pulmonary consult    -new diagnosis of COPD.  CT chest shows emphysema and pulmonary nodule.  -echocardiogram was considered but patient had a stress test 1 month ago that showed an EF of 65% and BNP <10  -repeat chest x-ray did not show any vascular congestion   -although chest x-ray was  negative for volume 1 dose of Lasix was ordered as well as increase in steroids per recommendation of pulmonology.  Pulmonary nodule 1 cm or greater in diameter  -patient has a 1 pack per day history of smoking   -pulmonary consulted for possible need for tissue biopsy.  Recommended outpatient follow up.  -patient does have an appointment in 1 month to establish care with pulmonology   -Wife inquired about a sooner appt with pulm therefore referral sent through [facility].    Hyperlipidemia  -continue home statin    Type 2 diabetes mellitus without complication, without long-term current use of insulin  -A1c 1 month ago was 6.5 but patient does not appear to be on any home diabetic medications   -SSI and Accu-Cheks   -diabetic diet   -monitor glucose closely as patient will be on steroids for his COPD exacerbation  -consult nutrition and diabetic educator      Tobacco use disorder  -patient counseled on smoking cessation >10 minutes  -nicotine patch ordered    COPD exacerbation  See acute respiratory failure  VTE Risk Mitigation (From admission, onward)           Ordered     IP VTE HIGH RISK PATIENT  Once         07/15/25 1738     Place sequential compression device  Until discontinued         07/15/25 1738     Place sequential compression device  Until discontinued         07/15/25 1738                    Discharge Planning   MEL: 7/19/2025     Code Status: Full Code   Medical Readiness for Discharge Date:   Discharge Plan A: Home, Home with family   Discharge Delays: None known at this time                    David Pike MD  Department of Hospital Medicine   [facility] Tele[facility] ([facility])

## 2025-07-17 NOTE — PLAN OF CARE
07/17/25 1528   Rounds   Attendance Provider;;Charge nurse   Discharge Plan A Home;Home with family   Why the patient remains in the hospital Requires continued medical care   Transition of Care Barriers None     Pt qualifies for home O2. [facility] processed order and was delivered to bedside.   Pulmonology referral faxed to [facility].  SW to remain available.

## 2025-07-17 NOTE — ASSESSMENT & PLAN NOTE
-patient has a 1 pack per day history of smoking   -pulmonary consulted for possible need for tissue biopsy.  Recommended outpatient follow up.  -patient does have an appointment in 1 month to establish care with pulmonology   -Wife inquired about a sooner appt with pulm therefore referral sent through [facility].

## 2025-07-17 NOTE — PROGRESS NOTES
[facility])  Pulmonology  Progress Note    Patient Name: Yazan Hathaway  MRN: [id number]  Admission Date: 7/15/2025  Hospital Length of Stay: 1 days  Code Status: Full Code  Attending Provider: David Pike MD  Primary Care Provider: Simi Johnson MD   Principal Problem: Acute hypoxic respiratory failure    Subjective:   50 yr old with asthma and smoking hx presents with SOB. We are being consulted for a pulmonary nodule RUL. Per chart review, patient still ppd smoker. Recently saw allergist for asthmatic type symptoms and was told he likely has COPD. Patient was referred to pulmonology and had appointment in August. Patient was also scheduled for routine CT chest but did not get completed. Recently prescribed albuterol with no relief. Describes worsening sob and wheezing with activity and also lying flat. CT chest with moderate to severe emphysema and RUL spiculated pulmonary nodule measuring 1cm. Patient was started on treatment for COPD exacerbation including nebs, steroids, abx, supplemental O2.     7/16/2025: Patient on 1L NC. Looks comfortable upon entering room but gets mildly tachypnea when awakening. Complains of productive cough and worsening sob with coughing fits/lying flat.     7/17/2025: Back and forth from 1 to 3L throughout day although sats >92%. Still complaining of sob with movement, getting oob. Productive cough. Sputum culture collected. Repeat echo wnl.     Objective:     Vital Signs (Most Recent):  Temp: 98.1 °F (36.7 °C) (07/17/25 1527)  Pulse: 83 (07/17/25 1527)  Resp: 18 (07/17/25 1527)  BP: 119/78 (07/17/25 1527)  SpO2: 96 % (07/17/25 1527) Vital Signs (24h Range):  Temp:  [98.1 °F (36.7 °C)-98.6 °F (37 °C)] 98.1 °F (36.7 °C)  Pulse:  [71-93] 83  Resp:  [15-20] 18  SpO2:  [92 %-98 %] 96 %  BP: (116-149)/(75-91) 119/78     Weight: 64.1 kg (141 lb 5 oz)  Body mass index is 27.6 kg/m².  Intake/Output Summary (Last 24 hours) at 7/17/2025 1613  Last data filed at  7/17/2025 1442  Gross per 24 hour   Intake 680 ml   Output --   Net 680 ml     Physical Exam  Constitutional:       General: He is not in acute distress.  HENT:      Head: Normocephalic.      Nose: Nose normal.   Eyes:      Conjunctiva/sclera: Conjunctivae normal.      Pupils: Pupils are equal, round, and reactive to light.   Cardiovascular:      Pulses: Normal pulses.   Pulmonary:      Effort: Tachypnea (at times during exam) present.      Breath sounds: Rales (at bases) present. No wheezing.      Comments: 3L on exam  Musculoskeletal:         General: No swelling. Normal range of motion.   Skin:     General: Skin is warm.      Capillary Refill: Capillary refill takes less than 2 seconds.      Coloration: Skin is not jaundiced.      Findings: No bruising.   Neurological:      Mental Status: He is alert and oriented to person, place, and time.   Psychiatric:         Mood and Affect: Mood normal.         Behavior: Behavior normal.     Review of Systems   Constitutional:  Negative for chills, fatigue and fever.   Respiratory:  Positive for cough and shortness of breath. Negative for chest tightness.    Cardiovascular:  Negative for chest pain.   Gastrointestinal:  Negative for abdominal pain, nausea and vomiting.   Genitourinary:  Negative for dysuria.   Neurological:  Negative for dizziness and headaches.   Psychiatric/Behavioral:  Negative for agitation and confusion.      Vents:  Oxygen Concentration (%): 32 (07/17/25 0800)    Lines/Drains/Airways       Peripheral Intravenous Line  Duration             Peripheral IV Single Lumen 07/15/25 1438 20 G Anterior;Left Forearm 2 days    Peripheral IV Single Lumen 07/15/25 1440 20 G 1 in Anterior;Right Forearm 2 days                  Significant Labs:    CBC/Anemia Profile:  Recent Labs   Lab 07/16/25 0415 07/17/25  0429   WBC 4.63 8.28   HGB 15.7 16.6   HCT 49.0 50.8    174   MCV 90 89   RDW 13.9 13.9     Chemistries:  Recent Labs   Lab 07/16/25  6645  07/17/25  0429    138   K 4.8 4.1    102   CO2 21* 24   BUN 13 20   CREATININE 0.7 0.7   CALCIUM 9.2 9.4   MG 2.1 2.1   PHOS 4.2 3.7     Significant Imaging:  I have reviewed all pertinent imaging results/findings within the past 24 hours.  I have reviewed and interpreted all pertinent imaging results/findings within the past 24 hours.    ABG  Recent Labs   Lab 07/15/25  1846   PH 7.371   PO2 110*   PCO2 44.6   HCO3 25.9   BE 1     Assessment & Plan  Acute hypoxic respiratory failure  COPD exacerbation  - Patient with Hypoxic Respiratory failure which is Acute. He is not on home oxygen. Supplemental oxygen was provided and noted- Oxygen Concentration (%):  [24-32] 32  - Signs/symptoms of respiratory failure include: tachypnea, increased work of breathing, respiratory distress, and use of accessory muscles.   - Contributing diagnoses includes - COPD Labs and images were reviewed. Patient Has not had a recent ABG.   - Agree with hospitalist orders  - Continue with supplemental O2 as needed, currently 1L  - Still complains of sob with cough/lying flat   - No wheezing currently on exam  - Continue with LABA/ICS + prn nebs  - Recheck echo  - Productive cough, order sputum cx; continue empiric abx for now  - OOB as able, IS   - 7/17: Patient oxygenation is okay but still with some intermittent wob/tachypnea. Repeat echo wnl. Some crackles at bases but otherwise no wheezing, good air movement. Repeat CXR with no changes. Sputum culture is pending. Continue to follow and continue abx for now. Can increase dose solu-medrol, +/- lasix and monitor response. Home O2 eval has been completed. [facility] pulmonology referral has been made.   Pulmonary nodule 1 cm or greater in diameter  - In AECOPD, not candidate for intervention at this moment  - Will need pulm establishment and follow up in clinic for further testing and plan of action.      Waqas Coon PA-C  Pulmonology  [facility] Telemetry ([facility])

## 2025-07-17 NOTE — PLAN OF CARE
Plan of care reviewed with patient with verbal understanding. Chart and orders reviewed.  Pt remains free from falls this shift, Safety precautions in place.   Pt currently resting comfortably in bed. Pain controlled with po and IV pain med (see emar for pain admin).  Purposeful rounding with bed in lowest position, side rails up, call light in reach.  Pt refused to have his CBG taken as he claims he does not and will not use insulin while in the hospital.  Will continue to monitor until end of shift.         Problem: Adult Inpatient Plan of Care  Goal: Plan of Care Review  Outcome: Progressing  Flowsheets (Taken 7/17/2025 0039)  Plan of Care Reviewed With: patient  Goal: Patient-Specific Goal (Individualized)  Outcome: Progressing  Flowsheets (Taken 7/17/2025 0039)  Individualized Care Needs: Pt request his CBG not be taken as he will not take the insulin ordered  Anxieties, Fears or Concerns: Pt voices no anxiety or concerns  Patient/Family-Specific Goals (Include Timeframe): Pt will be independant of suplimental oxygen by end of admission.     Problem: COPD (Chronic Obstructive Pulmonary Disease)  Goal: Optimal Chronic Illness Coping  Outcome: Progressing     Problem: Pneumonia  Goal: Fluid Balance  Outcome: Progressing

## 2025-07-17 NOTE — TELEPHONE ENCOUNTER
Copied from CRM #[id number]. Topic: General Inquiry - Return Call  >> Jul 17, 2025  1:56 PM Jamin wrote:  Type:  Patient Returning Call    Who Called:daughter   Who Left Message for Patient:nurse   Does the patient know what this is regarding?:yes/appt  Would the patient rather a call back or a response via [facility]? Call back   Best Call Back Number:[phone]  Additional Information:

## 2025-07-17 NOTE — SUBJECTIVE & OBJECTIVE
Interval History:  Follow up for COPD exacerbation.  Patient continues to have shortness a breath and qualified for home O2 at 4 L with ambulation.  Her appreciate Pulmonary assistance.    Review of Systems  Objective:     Vital Signs (Most Recent):  Temp: 98.1 °F (36.7 °C) (07/17/25 1527)  Pulse: 83 (07/17/25 1527)  Resp: 18 (07/17/25 1527)  BP: 119/78 (07/17/25 1527)  SpO2: 96 % (07/17/25 1527) Vital Signs (24h Range):  Temp:  [98.1 °F (36.7 °C)-98.6 °F (37 °C)] 98.1 °F (36.7 °C)  Pulse:  [71-93] 83  Resp:  [15-20] 18  SpO2:  [92 %-98 %] 96 %  BP: (116-149)/(70-91) 119/78     Weight: 64.1 kg (141 lb 5 oz)  Body mass index is 27.6 kg/m².    Intake/Output Summary (Last 24 hours) at 7/17/2025 1543  Last data filed at 7/17/2025 1442  Gross per 24 hour   Intake 680 ml   Output --   Net 680 ml         Physical Exam  Vitals and nursing note reviewed.   Constitutional:       Appearance: Normal appearance.   HENT:      Head: Normocephalic and atraumatic.      Nose: Nose normal.      Mouth/Throat:      Mouth: Mucous membranes are moist.   Eyes:      Extraocular Movements: Extraocular movements intact.      Conjunctiva/sclera: Conjunctivae normal.   Cardiovascular:      Rate and Rhythm: Normal rate and regular rhythm.      Pulses: Normal pulses.      Heart sounds: Normal heart sounds.   Pulmonary:      Effort: Respiratory distress present.      Breath sounds: No wheezing or rales.      Comments: Distant breath sounds bilaterally  Abdominal:      General: Abdomen is flat. Bowel sounds are normal.      Palpations: Abdomen is soft.   Musculoskeletal:      Cervical back: Normal range of motion and neck supple.   Skin:     General: Skin is warm.      Capillary Refill: Capillary refill takes less than 2 seconds.   Neurological:      Mental Status: He is alert. Mental status is at baseline.   Psychiatric:         Mood and Affect: Mood normal.         Behavior: Behavior normal.               Significant Labs: All pertinent labs  within the past 24 hours have been reviewed.  Recent Lab Results  (Last 5 results in the past 24 hours)        07/17/25  1130   07/17/25  0937   07/17/25  0458   07/17/25  0429   07/16/25  1648        Anion Gap       12         Ao root annulus   3.4             Ao peak radha   1.9             Ao VTI   37.9             AV valve area   3.1             ANGELA by Velocity Ratio   3.0             AV mean gradient   9             AV index (prosthetic)   0.82             AV peak gradient   14             AV Velocity Ratio   0.79             Baso #       0.01         Basophil %       0.1         BSA   1.65             BUN       20         Calcium       9.4         Chloride       102         CO2       24         Creatinine       0.7         Left Ventricle Relative Wall Thickness   0.49             E/A ratio   0.91             E/E' ratio   8             eGFR       >60  Comment: Estimated GFR calculated using the CKD-EPI creatinine (2021) equation.         EF   60             Eos #       0.00         Eos %       0.0         E wave deceleration time   163             FS   36.6             Glucose       146         Gran # (ANC)       6.94         Hematocrit       50.8         Hemoglobin       16.6         Immature Grans (Abs)       0.03  Comment: Mild elevation in immature granulocytes is non specific and can be seen in a variety of conditions including stress response, acute inflammation, trauma and pregnancy. Correlation with other laboratory and clinical findings is essential.         Immature Granulocytes       0.4         IVC diameter   1.81             IVSd   1.0             LA WIDTH   3.2             LA area A4C   12.41             Left Atrium Major Axis   4.4             Left Atrium Minor Axis   4.3             LA size   2.8             LA Vol   33             LA vol index   21             LVOT area   3.8             LV LATERAL E/E' RATIO   8.6             LV SEPTAL E/E' RATIO   7.8             LV EDV BP   75             LV  Diastolic Volume Index   46.58             Left Ventricular End Diastolic Volume by Teichholz Method   75.15             Left Ventricular End Systolic Volume by Teichholz Method   25.12             LV ESV A4C   26.36             LVIDd   4.1             LVIDs   2.6             LV mass   132.1             LV Mass Index   82.1             Left Ventricular Outflow Tract Mean Gradient   5.34             Left Ventricular Outflow Tract Mean Velocity   1.09             LVOT diameter   2.2             LVOT peak luis a   1.5             LVOT stroke volume   118.5             LVOT peak VTI   31.2             LV ESV BP   25             LV Systolic Volume Index   15.5             Lymph #       0.70         Lymph %       8.5         Magnesium        2.1         MCH       29.0         MCHC       32.7         MCV       89         Mean e'   0.11             Mono #       0.60         Mono %       7.2         MPV       10.3         MV valve area p 1/2 method   3.92             MV valve area by continuity eq   4.63             MV mean gradient   1             MV peak gradient   4             MV Peak A Luis A   0.94             MV Peak E Luis A   0.86             MV stenosis pressure 1/2 time   56.06             MV VTI   25.6             Neut %       83.8         nRBC       0         [facility] CV CPX PATIENT HEIGHT IN   60             Phosphorus Level       3.7         Platelet Count       174         POCT Glucose 139     166     170       Potassium       4.1         Pulmonary Valve Mean Velocity   0.90             PV mean gradient   4             PV peak gradient   6             PV PEAK VELOCITY   1.26             PW   1.0             RA Major Axis   4.26             Est. RA pres   3             RA Width   3.0             RBC       5.72         RDW       13.9         RV S'   13.58             RV/LV Ratio   0.73             RVDD   2.95             RV- villalta basal diam   3.0             RVOT peak luis a   1.24             RVOT peak VTI   25.6              Sodium       138         TAPSE   2.8             TDI SEPTAL   0.11             TDI LATERAL   0.10             WBC       8.28         ZLVIDD   -1.08             ZLVIDS   -0.68                                    Significant Imaging:   X-Ray Chest 1 View   Final Result      No detrimental interval change or acute abnormality as above.         Electronically signed by: Saleem Harris   Date:    07/17/2025   Time:    13:53      CT Chest Without Contrast   Final Result    Moderate to severe emphysema.  Worrisome spiculated right upper lobe pulmonary nodule, 1 cm.  Recommend follow-up according to the Fleischner Society criteria.            Fleischner Society 2017 Guidelines - http://bit.ly/fleischner      Solid Nodules - Single      Low risk   Less than 6 mm - No routine follow-up   6-8 mm - CT at 6-12 months, then consider CT at 18-24 months   Greater than 8 mm - Consider CT at 3 months, PET/CT, or tissue sampling      Comments:   Nodules less than 6 mm do not require routine follow-up in low -risk patients      High risk   Less than 6 mm - Optional CT at 12 months   6-8 mm - CT at 6-12 months, then CT at 18-24 months   Greater than 8 mm - Consider CT at 3 months, PET/CT, or tissue sampling      Comments:   Certain patients at high risk with suspicious nodule morphology, upper lobe location, or both may warrant 12-month follow-up                  All CT scans at [this location] are performed using dose modulation techniques as appropriate to a performed exam including the following:  Automated exposure control; adjustment of the mA and/or kV according to patient size (this includes techniques or standardized protocols for targeted exams where dose is matched to indication / reason for exam; i.e. extremities or head); use of iterative reconstruction technique.      Finalized on: 7/15/2025 4:50 PM By:  Khadar Rowe MD   [facility]# [id number]      2025-07-15 16:52:[id number]     [facility]      X-Ray Chest AP Portable   Final Result       There is a poorly visualized 9 mm oval shaped density projected over the right upper lobe.  If additional imaging evaluation is clinically indicated, I recommend consideration of a CT examination of the thorax without IV contrast in 3 months.         Electronically signed by: Cornel Lim MD   Date:    07/15/2025   Time:    13:59

## 2025-07-18 VITALS
HEART RATE: 84 BPM | WEIGHT: 132.5 LBS | DIASTOLIC BLOOD PRESSURE: 78 MMHG | TEMPERATURE: 98 F | BODY MASS INDEX: 26.01 KG/M2 | RESPIRATION RATE: 20 BRPM | SYSTOLIC BLOOD PRESSURE: 115 MMHG | OXYGEN SATURATION: 95 % | HEIGHT: 60 IN

## 2025-07-18 LAB
ABSOLUTE EOSINOPHIL (OHS): 0 K/UL
ABSOLUTE MONOCYTE (OHS): 0.49 K/UL (ref 0.3–1)
ABSOLUTE NEUTROPHIL COUNT (OHS): 3.96 K/UL (ref 1.8–7.7)
ANION GAP (OHS): 13 MMOL/L (ref 8–16)
BASOPHILS # BLD AUTO: 0.01 K/UL
BASOPHILS NFR BLD AUTO: 0.2 %
BUN SERPL-MCNC: 33 MG/DL (ref 6–20)
CALCIUM SERPL-MCNC: 9.5 MG/DL (ref 8.7–10.5)
CHLORIDE SERPL-SCNC: 99 MMOL/L (ref 95–110)
CO2 SERPL-SCNC: 27 MMOL/L (ref 23–29)
CREAT SERPL-MCNC: 0.8 MG/DL (ref 0.5–1.4)
ERYTHROCYTE [DISTWIDTH] IN BLOOD BY AUTOMATED COUNT: 13.7 % (ref 11.5–14.5)
GFR SERPLBLD CREATININE-BSD FMLA CKD-EPI: >60 ML/MIN/1.73/M2
GLUCOSE SERPL-MCNC: 172 MG/DL (ref 70–110)
HCT VFR BLD AUTO: 51.3 % (ref 40–54)
HGB BLD-MCNC: 17.2 GM/DL (ref 14–18)
IMM GRANULOCYTES # BLD AUTO: 0.03 K/UL (ref 0–0.04)
IMM GRANULOCYTES NFR BLD AUTO: 0.5 % (ref 0–0.5)
LYMPHOCYTES # BLD AUTO: 1.37 K/UL (ref 1–4.8)
MAGNESIUM SERPL-MCNC: 2.6 MG/DL (ref 1.6–2.6)
MCH RBC QN AUTO: 29 PG (ref 27–31)
MCHC RBC AUTO-ENTMCNC: 33.5 G/DL (ref 32–36)
MCV RBC AUTO: 86 FL (ref 82–98)
NUCLEATED RBC (/100WBC) (OHS): 0 /100 WBC
PHOSPHATE SERPL-MCNC: 4.8 MG/DL (ref 2.7–4.5)
PLATELET # BLD AUTO: 194 K/UL (ref 150–450)
PMV BLD AUTO: 10.4 FL (ref 9.2–12.9)
POCT GLUCOSE: 163 MG/DL (ref 70–110)
POTASSIUM SERPL-SCNC: 3.9 MMOL/L (ref 3.5–5.1)
RBC # BLD AUTO: 5.94 M/UL (ref 4.6–6.2)
RELATIVE EOSINOPHIL (OHS): 0 %
RELATIVE LYMPHOCYTE (OHS): 23.4 % (ref 18–48)
RELATIVE MONOCYTE (OHS): 8.4 % (ref 4–15)
RELATIVE NEUTROPHIL (OHS): 67.5 % (ref 38–73)
SODIUM SERPL-SCNC: 139 MMOL/L (ref 136–145)
WBC # BLD AUTO: 5.86 K/UL (ref 3.9–12.7)

## 2025-07-18 PROCEDURE — 36415 COLL VENOUS BLD VENIPUNCTURE: CPT | Performed by: FAMILY MEDICINE

## 2025-07-18 PROCEDURE — 80048 BASIC METABOLIC PNL TOTAL CA: CPT | Performed by: FAMILY MEDICINE

## 2025-07-18 PROCEDURE — 99900035 HC TECH TIME PER 15 MIN (STAT)

## 2025-07-18 PROCEDURE — 27000221 HC OXYGEN, UP TO 24 HOURS

## 2025-07-18 PROCEDURE — 94761 N-INVAS EAR/PLS OXIMETRY MLT: CPT

## 2025-07-18 PROCEDURE — 63600175 PHARM REV CODE 636 W HCPCS: Mod: JZ,TB | Performed by: FAMILY MEDICINE

## 2025-07-18 PROCEDURE — 94640 AIRWAY INHALATION TREATMENT: CPT

## 2025-07-18 PROCEDURE — 25000242 PHARM REV CODE 250 ALT 637 W/ HCPCS: Performed by: FAMILY MEDICINE

## 2025-07-18 PROCEDURE — 84100 ASSAY OF PHOSPHORUS: CPT | Performed by: FAMILY MEDICINE

## 2025-07-18 PROCEDURE — 85025 COMPLETE CBC W/AUTO DIFF WBC: CPT | Performed by: FAMILY MEDICINE

## 2025-07-18 PROCEDURE — 63700000 PHARM REV CODE 250 ALT 637 W/O HCPCS: Performed by: FAMILY MEDICINE

## 2025-07-18 PROCEDURE — 83735 ASSAY OF MAGNESIUM: CPT | Performed by: FAMILY MEDICINE

## 2025-07-18 RX ORDER — PREDNISONE 10 MG/1
TABLET ORAL
Qty: 54 TABLET | Refills: 0 | Status: SHIPPED | OUTPATIENT
Start: 2025-07-18 | End: 2025-07-18

## 2025-07-18 RX ORDER — IBUPROFEN 200 MG
1 TABLET ORAL DAILY
Qty: 28 PATCH | Refills: 0 | Status: SHIPPED | OUTPATIENT
Start: 2025-07-19

## 2025-07-18 RX ORDER — PREDNISONE 10 MG/1
TABLET ORAL
Qty: 53 TABLET | Refills: 0 | Status: SHIPPED | OUTPATIENT
Start: 2025-07-18 | End: 2025-08-02

## 2025-07-18 RX ORDER — AZITHROMYCIN 250 MG/1
250 TABLET, FILM COATED ORAL DAILY
Qty: 4 TABLET | Refills: 0 | Status: SHIPPED | OUTPATIENT
Start: 2025-07-18 | End: 2025-07-22

## 2025-07-18 RX ORDER — METFORMIN HYDROCHLORIDE 500 MG/1
500 TABLET ORAL 2 TIMES DAILY WITH MEALS
Qty: 60 TABLET | Refills: 0 | Status: SHIPPED | OUTPATIENT
Start: 2025-07-18 | End: 2025-07-22

## 2025-07-18 RX ORDER — GUAIFENESIN AND DEXTROMETHORPHAN HYDROBROMIDE 10; 100 MG/5ML; MG/5ML
5 SYRUP ORAL EVERY 6 HOURS PRN
Qty: 118 ML | Refills: 0 | Status: SHIPPED | OUTPATIENT
Start: 2025-07-18 | End: 2025-07-22

## 2025-07-18 RX ADMIN — BUDESONIDE INHALATION 0.5 MG: 0.5 SUSPENSION RESPIRATORY (INHALATION) at 07:07

## 2025-07-18 RX ADMIN — AZITHROMYCIN DIHYDRATE 250 MG: 250 TABLET ORAL at 09:07

## 2025-07-18 RX ADMIN — CEFTRIAXONE 1 G: 1 INJECTION, POWDER, FOR SOLUTION INTRAMUSCULAR; INTRAVENOUS at 09:07

## 2025-07-18 RX ADMIN — METHYLPREDNISOLONE SODIUM SUCCINATE 60 MG: 40 INJECTION, POWDER, FOR SOLUTION INTRAMUSCULAR; INTRAVENOUS at 06:07

## 2025-07-18 RX ADMIN — ARFORMOTEROL TARTRATE 15 MCG: 15 SOLUTION RESPIRATORY (INHALATION) at 07:07

## 2025-07-18 NOTE — DISCHARGE INSTRUCTIONS
Our goal at [facility] is to always give you outstanding care and exceptional service. You may receive a survey from [employer] by mail, text or e-mail in the next 24-48 hours asking about the care you received with us. The survey should only take 5-10 minutes to complete and is very important to us.     Your feedback provides us with a way to recognize our staff who work tirelessly to provide the best care! Also, your responses help us learn how to improve when your experience was below our aspiration of excellence. We are always looking for ways to improve your stay. We WILL use your feedback to continue making improvements to help us provide the highest quality care. We keep your personal information and feedback confidential. We appreciate your time completing this survey and can't wait to hear from you!!!    We look forward to your continued care with us! Thanks so much for choosing [facility] for your healthcare needs!

## 2025-07-18 NOTE — ASSESSMENT & PLAN NOTE
- In AECOPD, not candidate for intervention at this moment  - Will need pulm establishment and follow up in clinic for further testing and plan of action.    Problem: Pressure Injury, Risk for  Goal: # Skin remains intact  Outcome: Outcome Met, Continue evaluating goal progress toward completion  Turning Q2.    Problem: At Risk for Falls  Goal: # Patient does not fall  Outcome: Outcome Met, Continue evaluating goal progress toward completion  Patient participates in fall prevention measures.

## 2025-07-18 NOTE — PLAN OF CARE
A250/A250 ZULEYMA Hand Ray Hathaway is a 50 y.o.male admitted on 7/15/2025 for Acute hypoxic respiratory failure   Code Status: Full Code MRN: [id number]   Review of patient's allergies indicates:   Allergen Reactions    Beef containing products     Shellfish containing products      Past Medical History:   Diagnosis Date    Asthma     Chronic knee pain     Hyperlipidemia     Lower leg fracture     Right, as a child      PRN meds    acetaminophen, 650 mg, Q4H PRN  acetaminophen, 650 mg, Q8H PRN  albuterol-ipratropium, 3 mL, Q6H PRN  dextromethorphan-guaiFENesin  mg/5 ml, 5 mL, Q4H PRN  dextrose 50%, 12.5 g, PRN  dextrose 50%, 25 g, PRN  glucagon (human recombinant), 1 mg, PRN  glucose, 16 g, PRN  glucose, 24 g, PRN  HYDROcodone-acetaminophen, 1 tablet, Q4H PRN  insulin aspart U-100, 0-10 Units, QID (AC + HS) PRN  sodium chloride 0.9%, 10 mL, PRN  sodium chloride 0.9%, 3 mL, PRN      Chart check completed. Will continue plan of care.         [place] Coma Scale Score: 15     Lead Monitored: Lead II Rhythm: normal sinus rhythm    Cardiac/Telemetry Box Number: [id number]  VTE Core Measure: Patient refused interventions (Pt is up ambulating independantly and refuses SCDs) Last Bowel Movement: 07/16/25  Diet Consistent Carbohydrate 2000 Calories (up to 75 gm per meal)  Diet Adult Regular     Rj Score: 22  Fall Risk Score: 6  Accucheck [x]   Freq? achs     Lines/Drains/Airways       Peripheral Intravenous Line  Duration             Peripheral IV Single Lumen 07/15/25 1438 20 G Anterior;Left Forearm 2 days    Peripheral IV Single Lumen 07/15/25 1440 20 G 1 in Anterior;Right Forearm 2 days

## 2025-07-18 NOTE — PLAN OF CARE
Pt sob with exertion, on 3L nc, AAOx4. Chart reviewed, pt has no concerns at this time. Pt slept in recliner with oxygen, no pain noted, wife at bedside. Will continue to monitor until end of shift.  Problem: Adult Inpatient Plan of Care  Goal: Plan of Care Review  Outcome: Progressing  Goal: Patient-Specific Goal (Individualized)  Outcome: Progressing  Goal: Absence of Hospital-Acquired Illness or Injury  Outcome: Progressing  Goal: Optimal Comfort and Wellbeing  Outcome: Progressing  Goal: Readiness for Transition of Care  Outcome: Progressing     Problem: Diabetes Comorbidity  Goal: Blood Glucose Level Within Targeted Range  Outcome: Progressing     Problem: Sepsis/Septic Shock  Goal: Optimal Coping  Outcome: Progressing  Goal: Absence of Bleeding  Outcome: Progressing  Goal: Blood Glucose Level Within Targeted Range  Outcome: Progressing  Goal: Absence of Infection Signs and Symptoms  Outcome: Progressing  Goal: Optimal Nutrition Intake  Outcome: Progressing     Problem: COPD (Chronic Obstructive Pulmonary Disease)  Goal: Optimal Chronic Illness Coping  Outcome: Progressing  Goal: Optimal Level of Functional [place]  Outcome: Progressing  Goal: Absence of Infection Signs and Symptoms  Outcome: Progressing  Goal: Improved Oral Intake  Outcome: Progressing  Goal: Effective Oxygenation and Ventilation  Outcome: Progressing     Problem: Pneumonia  Goal: Fluid Balance  Outcome: Progressing  Goal: Resolution of Infection Signs and Symptoms  Outcome: Progressing  Goal: Effective Oxygenation and Ventilation  Outcome: Progressing

## 2025-07-18 NOTE — ASSESSMENT & PLAN NOTE
-patient has a 1 pack per day history of smoking   -pulmonary consulted for possible need for tissue biopsy.  Recommended outpatient follow up.  -patient does have an appointment in 1 month to establish care with pulmonology   -Wife inquired about a sooner appt with pulm therefore referral sent through [facility].

## 2025-07-18 NOTE — PROGRESS NOTES
AVS virtually reviewed with patient and his daughter Kenna in its entirety with emphasis on diet, medications, follow-up appointments and reasons to return to the ED. Patient also encouraged to utilize their patient portal. Ease and convenience of use reiterated. Education complete and patient voiced understanding. All questions answered. Discharge teaching completed.

## 2025-07-18 NOTE — DISCHARGE SUMMARY
[facility] ([facility])  [facility] Medicine  Discharge Summary      Patient Name: Yazan Hathaway  MRN: [id number]  [facility]: [id number]  Patient Class: IP- Inpatient  Admission Date: 7/15/2025  Hospital Length of Stay: 2 days  Discharge Date and Time: No discharge date for patient encounter.  Attending Physician: David Pike MD   Discharging Provider: David Pike MD  Primary Care Provider: Simi Johnson MD    Primary Care Team: Networked reference to record PCT     HPI:   Mr. Hathaway is a 50-year-old male with past medical history of hyperlipidemia, asthma, diabetes, and tobacco abuse.  Patient admits to smoking 1 pack per day and was recently seen by a allergist who feels he likely has COPD for which the patient has been referred to pulmonology but follow up appointment is in 1 month.  He states his shortness a breath had started approximately 6 months ago but had worsened over the last 2 days hence prompting him to come to the ER.  Patient was noted to have a low-grade fever of 100.7 on arrival.  Labs on arrival are unremarkable and chest x-ray showed There is a poorly visualized 9 mm oval shaped density projected over the right upper lobe.  If additional imaging evaluation is clinically indicated, I recommend consideration of a CT examination of the thorax without IV contrast in 3 months.  CT chest was ordered and showed moderate to severe emphysema.  Worrisome spiculated right upper lobe pulmonary nodule, 1 cm.  Pulmonary is consulted for evaluation as patient is high-risk with a notable upper lobe nodule that may need to be biopsied.  Patient has been started on IV steroids and breathing treatments as well as continued inhaler.  An echocardiogram was considered but patient was noted to have had a stress test on 06/03/2025 which noted an EF of 65% and BNP is less than 10.  Hospital medicine will admit for observation for emphysema exacerbation.    * No surgery found *      Hospital Course:   Patient  admitted with new diagnosis of emphysema and pulmonary nodule.  He is still requiring nasal cannula oxygen to maintain sats greater than 88%.  He has been weaned down to 1 L at this time but is tachypneic and has increase work of breathing.  Patient was given 1 dose of xanax as he appears to have an anxiety component effecting his breathing as well.  Will check home o2 eval in AM if patient is unable to wean to room air in the morning.  Patient was seen by pulmonology for pulmonary nodule for which he recommended follow up in clinic therefore referral will be place.  Pulmonary recommended repeat chest x-ray that did not show any volume in the lungs but given his significant shortness a breath they recommended 1 dose of Lasix as well as increasing the steroids to 60 IV b.i.d..  Home O2 eval completed and patient is noted to need 4 L nasal cannula oxygen with ambulation.  Patient was doing much better on day of discharge and we will plan for a tapering dose of steroids.  He has also been counseled on following up with [facility] pulmonology for which she has an appointment in August.  Patient is stable for discharge at this time.  Case was also cleared by pulmonology for discharge.     Goals of Care Treatment Preferences:  Code Status: Full Code         Consults:   Consults (From admission, onward)          Status Ordering Provider     Inpatient consult to Pulmonology  Once        Provider:  William Jaimes MD    Completed ANTONIO SALDIVAR     Inpatient consult to Diabetes educator  Once        Provider:  (Not yet assigned)    Completed ANTONIO SALDIVAR     Inpatient consult to Registered Dietitian/Nutritionist  Once        Provider:  (Not yet assigned)    Completed ANTONIO SALDIVAR            Assessment & Plan  Acute hypoxic respiratory failure  Patient with Hypoxic Respiratory failure which is Acute on chronic.  he is not on home oxygen. Supplemental oxygen was provided and noted- Oxygen Concentration (%):  [32] 32    .    Signs/symptoms of respiratory failure include- tachypnea and increased work of breathing. Contributing diagnoses includes - COPD Labs and images were reviewed. Patient Has not had a recent ABG, but ordered. Will treat underlying causes and adjust management of respiratory failure as follows- steroids, DuoNebs, Trelegy, Pulmonary consult    -new diagnosis of COPD.  CT chest shows emphysema and pulmonary nodule.  -echocardiogram was considered but patient had a stress test 1 month ago that showed an EF of 65% and BNP <10  -repeat chest x-ray did not show any vascular congestion   -although chest x-ray was negative for volume 1 dose of Lasix was ordered as well as increase in steroids per recommendation of pulmonology.  Pulmonary nodule 1 cm or greater in diameter  -patient has a 1 pack per day history of smoking   -pulmonary consulted for possible need for tissue biopsy.  Recommended outpatient follow up.  -patient does have an appointment in 1 month to establish care with pulmonology   -Wife inquired about a sooner appt with pulm therefore referral sent through [facility].    Hyperlipidemia  -continue home statin    Type 2 diabetes mellitus without complication, without long-term current use of insulin  -A1c 1 month ago was 6.5 but patient does not appear to be on any home diabetic medications   -SSI and Accu-Cheks   -diabetic diet   -monitor glucose closely as patient will be on steroids for his COPD exacerbation  -consult nutrition and diabetic educator  -patient has been counseled to follow up with his primary care doctor as he is has an elevated A1c.  Patient has been sent on metformin 500 mg b.i.d. for 30 days that should be followed up with his primary care physician.      Tobacco use disorder  -patient counseled on smoking cessation >10 minutes  -nicotine patch ordered    COPD exacerbation  See acute respiratory failure    Final Active Diagnoses:    Diagnosis Date Noted POA    PRINCIPAL PROBLEM:  Acute hypoxic  respiratory failure [J96.01] 07/15/2025 Yes    COPD exacerbation [J44.1] 07/16/2025 Yes    Pulmonary nodule 1 cm or greater in diameter [R91.1] 07/15/2025 Yes    Tobacco use disorder [F17.200] 06/03/2025 Yes    Type 2 diabetes mellitus without complication, without long-term current use of insulin [E11.9] 05/22/2024 Yes     Chronic    Hyperlipidemia [E78.5]  Yes     Chronic      Problems Resolved During this Admission:       Discharged Condition: stable    Disposition:     Follow Up:   Follow-up Information       Rc Mccloud MD Follow up.    Specialty: Pulmonary Disease  Why: @1:00  Contact information:  [address]  [address]  [place] [address]  [phone]               Simi Johnson MD Follow up in 1 week(s).    Specialty: Family Medicine  Contact information:  [address]  [place] [address]  [phone]                           Patient Instructions:      OXYGEN FOR HOME USE     Order Specific Question Answer Comments   Liter Flow 4    Duration Continuous    Qualifying Test Performed at: Activity    Oxygen saturation at rest 90    Oxygen saturation with activity 86    Oxygen saturation with activity on oxygen 94    Portable mode: continuous    Route nasal cannula    Device: home concentrator with portable tanks    Length of need (in months): 3 mos    Patient condition with qualifying saturation COPD    Height: 5' (1.524 m)    Weight: 64.1 kg (141 lb 5 oz)    Alternative treatment measures have been tried or considered and deemed clinically ineffective. Yes      Ambulatory referral/consult to Diabetes Education   Standing Status: Future   Referral Priority: Routine Referral Type: Consultation   Referral Reason: Specialty Services Required   Requested Specialty: Diabetes   Number of Visits Requested: 1 Expiration Date: 07/16/26     Ambulatory referral/consult to Pulmonology   Standing Status: Future   Referral Priority: Routine Referral Type: Consultation   Referral Reason:  "Specialty Services Required   Requested Specialty: Pulmonary Disease   Number of Visits Requested: 1     Ambulatory referral/consult to Smoking Cessation Program   Standing Status: Future   Referral Priority: Routine Referral Type: Consultation   Referral Reason: Specialty Services Required   Requested Specialty: CTTS   Number of Visits Requested: 1     Diet Adult Regular     Reason for not Prescribing Nicotine Replacement   Order Comments: Patches ordered on the med rec     Order Specific Question Answer Comments   Reason for not Prescribing: Not medically appropriate at this time ordered     Activity as tolerated       Significant Diagnostic Studies: Labs: BMP:   Recent Labs   Lab 07/17/25  0429 07/18/25  0448   * 172*    139   K 4.1 3.9    99   CO2 24 27   BUN 20 33*   CREATININE 0.7 0.8   CALCIUM 9.4 9.5   MG 2.1 2.6   , CBC   Recent Labs   Lab 07/17/25  0429 07/18/25  0448   WBC 8.28 5.86   HGB 16.6 17.2   HCT 50.8 51.3    194   , and Troponin   Recent Labs   Lab 07/15/25  1351   TROPONINI <0.006     Microbiology: Sputum Culture No results found for: "GSRESP", "RESPIRATORYC"  Radiology:   X-Ray Chest 1 View   Final Result      No detrimental interval change or acute abnormality as above.         Electronically signed by: Saleem Harris   Date:    07/17/2025   Time:    13:53      CT Chest Without Contrast   Final Result    Moderate to severe emphysema.  Worrisome spiculated right upper lobe pulmonary nodule, 1 cm.  Recommend follow-up according to the Fleischner Society criteria.            Fleischner Society 2017 Guidelines - http://bit.ly/fleischner      Solid Nodules - Single      Low risk   Less than 6 mm - No routine follow-up   6-8 mm - CT at 6-12 months, then consider CT at 18-24 months   Greater than 8 mm - Consider CT at 3 months, PET/CT, or tissue sampling      Comments:   Nodules less than 6 mm do not require routine follow-up in low -risk patients      High risk   Less than " 6 mm - Optional CT at 12 months   6-8 mm - CT at 6-12 months, then CT at 18-24 months   Greater than 8 mm - Consider CT at 3 months, PET/CT, or tissue sampling      Comments:   Certain patients at high risk with suspicious nodule morphology, upper lobe location, or both may warrant 12-month follow-up                  All CT scans at [this location] are performed using dose modulation techniques as appropriate to a performed exam including the following:  Automated exposure control; adjustment of the mA and/or kV according to patient size (this includes techniques or standardized protocols for targeted exams where dose is matched to indication / reason for exam; i.e. extremities or head); use of iterative reconstruction technique.      Finalized on: 7/15/2025 4:50 PM By:  Khadar Rowe MD   [facility]# [id number]      2025-07-15 16:52:[id number]     [facility]      X-Ray Chest AP Portable   Final Result      There is a poorly visualized 9 mm oval shaped density projected over the right upper lobe.  If additional imaging evaluation is clinically indicated, I recommend consideration of a CT examination of the thorax without IV contrast in 3 months.         Electronically signed by: Cornel Lim MD   Date:    07/15/2025   Time:    13:59           Pending Diagnostic Studies:       None           Medications:  Reconciled Home Medications:      Medication List        START taking these medications      azithromycin 250 MG tablet  Commonly known as: Z-ANATOLIY  Take 1 tablet (250 mg total) by mouth once daily. for 4 days     CHEST CONGESTION RELIEF DM  mg/5 mL liquid  Generic drug: dextromethorphan-guaiFENesin  mg/5 ml  Take 5 mLs by mouth every 6 (six) hours as needed (coughing).     metFORMIN 500 MG tablet  Commonly known as: GLUCOPHAGE  Take 1 tablet (500 mg total) by mouth 2 (two) times daily with meals.     nicotine 21 mg/24 hr  Commonly known as: NICODERM CQ  Place 1 patch onto the skin once daily.  Start taking on: July 19,  2025     predniSONE 10 MG tablet  Commonly known as: DELTASONE  Take 5 tablets (50 mg total) by mouth 2 (two) times daily for 3 days, THEN 2 tablets (20 mg total) 2 (two) times daily for 3 days, THEN 1 tablet (10 mg total) 2 (two) times daily for 3 days, THEN 1 tablet (10 mg total) once daily for 3 days, THEN ½ tablet (5 mg total) once daily for 3 days.  Start taking on: July 18, 2025     TRUE METRIX GLUCOSE METER Misc  Generic drug: blood-glucose meter  Use to check blood sugar twice daily     TRUE METRIX GLUCOSE TEST STRIP Strp  Generic drug: blood sugar diagnostic  To check blood glucose 2 times daily     TRUEPLUS LANCETS 33 gauge Misc  Generic drug: lancets  To check blood glucose 2 times daily            CONTINUE taking these medications      albuterol 90 mcg/actuation inhaler  Commonly known as: PROVENTIL/VENTOLIN HFA  Inhale 1-2 puffs into the lungs every 4 (four) hours as needed for Wheezing. Rescue     atorvastatin 10 MG tablet  Commonly known as: LIPITOR  Take 1 tablet (10 mg total) by mouth every evening.     TRELEGY ELLIPTA 200-62.5-25 mcg inhaler  Generic drug: fluticasone-umeclidin-vilanter  Inhale 1 puff into the lungs once daily.              Indwelling Lines/Drains at time of discharge:   Lines/Drains/Airways       None                       Time spent on the discharge of patient: 40 minutes         David Pike MD  Department of Hospital Medicine  [facility] Tele[facility] ([facility])

## 2025-07-18 NOTE — SUBJECTIVE & OBJECTIVE
Objective:     Vital Signs (Most Recent):  Temp: 98 °F (36.7 °C) (07/18/25 0730)  Pulse: 87 (07/18/25 0800)  Resp: 20 (07/18/25 0730)  BP: 118/88 (07/18/25 0730)  SpO2: 98 % (07/18/25 0730) Vital Signs (24h Range):  Temp:  [97.7 °F (36.5 °C)-98.6 °F (37 °C)] 98 °F (36.7 °C)  Pulse:  [80-97] 87  Resp:  [18-20] 20  SpO2:  [91 %-98 %] 98 %  BP: (116-121)/(77-88) 118/88     Weight: 60.1 kg (132 lb 7.9 oz)  Body mass index is 25.88 kg/m².  Intake/Output Summary (Last 24 hours) at 7/18/2025 1055  Last data filed at 7/17/2025 1753  Gross per 24 hour   Intake 500 ml   Output --   Net 500 ml     Physical Exam  Constitutional:       General: He is not in acute distress.  HENT:      Head: Normocephalic.      Mouth/Throat:      Mouth: Mucous membranes are moist.   Eyes:      Conjunctiva/sclera: Conjunctivae normal.      Pupils: Pupils are equal, round, and reactive to light.   Cardiovascular:      Pulses: Normal pulses.   Pulmonary:      Effort: Pulmonary effort is normal. No respiratory distress.      Breath sounds: Rales present. No wheezing.   Abdominal:      Palpations: Abdomen is soft.   Musculoskeletal:         General: No swelling.   Skin:     General: Skin is warm.      Capillary Refill: Capillary refill takes less than 2 seconds.      Coloration: Skin is not jaundiced.      Findings: No bruising.   Neurological:      Mental Status: He is alert and oriented to person, place, and time.   Psychiatric:         Mood and Affect: Mood normal.         Behavior: Behavior normal.     Review of Systems   Constitutional:  Negative for chills, fatigue and fever.   Respiratory:  Positive for cough and shortness of breath. Negative for chest tightness.    Cardiovascular:  Negative for chest pain.   Gastrointestinal:  Negative for abdominal pain, nausea and vomiting.   Genitourinary:  Negative for dysuria.   Neurological:  Negative for dizziness and headaches.   Psychiatric/Behavioral:  Negative for agitation and confusion.       Vents:  Oxygen Concentration (%): 32 (07/18/25 0722)    Lines/Drains/Airways       Peripheral Intravenous Line  Duration             Peripheral IV Single Lumen 07/15/25 1438 20 G Anterior;Left Forearm 2 days    Peripheral IV Single Lumen 07/15/25 1440 20 G 1 in Anterior;Right Forearm 2 days                  Significant Labs:    CBC/Anemia Profile:  Recent Labs   Lab 07/17/25  0429 07/18/25  0448   WBC 8.28 5.86   HGB 16.6 17.2   HCT 50.8 51.3    194   MCV 89 86   RDW 13.9 13.7     Chemistries:  Recent Labs   Lab 07/17/25  0429 07/18/25  0448    139   K 4.1 3.9    99   CO2 24 27   BUN 20 33*   CREATININE 0.7 0.8   CALCIUM 9.4 9.5   MG 2.1 2.6   PHOS 3.7 4.8*     Significant Imaging:  I have reviewed all pertinent imaging results/findings within the past 24 hours.  I have reviewed and interpreted all pertinent imaging results/findings within the past 24 hours.

## 2025-07-18 NOTE — ASSESSMENT & PLAN NOTE
- Patient with Hypoxic Respiratory failure which is Acute. He is not on home oxygen. Supplemental oxygen was provided and noted- Oxygen Concentration (%):  [32] 32  - Signs/symptoms of respiratory failure include: tachypnea, increased work of breathing, respiratory distress, and use of accessory muscles.   - Contributing diagnoses includes - COPD Labs and images were reviewed. Patient Has not had a recent ABG.   - Agree with hospitalist orders  - Continue with supplemental O2 as needed, currently 1L  - Still complains of sob with cough/lying flat   - No wheezing currently on exam  - Continue with LABA/ICS + prn nebs  - Recheck echo  - Productive cough, order sputum cx; continue empiric abx for now  - OOB as able, IS   - 7/17: Patient oxygenation is okay but still with some intermittent wob/tachypnea. Repeat echo wnl. Some crackles at bases but otherwise no wheezing, good air movement. Repeat CXR with no changes. Sputum culture is pending. Continue to follow and continue abx for now. Can increase dose solu-medrol, +/- lasix and monitor response. Home O2 eval has been completed. [facility] pulmonology referral has been made.   - 7/18: Looks and feels better today. Going home with O2 supplementation. Will continue steroid taper. Ambulatory referral to [facility] pulm has been sent. Discussed importance of follow up to manage COPD and follow up imaging. Will continue home Trelegy and prn albuterol.

## 2025-07-18 NOTE — PLAN OF CARE
[facility] - Tele[facility] (Hospital)  Discharge Final Note    Primary Care Provider: Simi Johnsno MD    Expected Discharge Date: 7/18/2025    Final Discharge Note (most recent)       Final Note - 07/18/25 1256          Final Note    Assessment Type Final Discharge Note     Anticipated Discharge Disposition Home or Self Care     Hospital Resources/Appts/Education Provided Post-Acute resouces added to AVS;Appointments scheduled and added to AVS        Post-Acute Status    Post-Acute Authorization E     E Status Set-up Complete/Auth obtained     Discharge Delays None known at this time                   Pt to discharge home today.  [facility] delivered home o2 at bedside  AVS updated  Hospital follow up scheduled on AVS:   Hospital Follow Up with Simi Johnson MD  Tuesday Jul 22, 2025 1:00 PM    Important Message from Medicare             Contact Info       Rc Mccloud MD   Specialty: Pulmonary Disease    [address]  [address]  [facility] [address]   Phone: [phone]       Next Steps: Follow up    Instructions: @1:00    Simi Johnson MD   Specialty: Family Medicine   Relationship: PCP - General    [address] [facility] [address]   Phone: [phone]       Next Steps: Follow up in 1 week(s)

## 2025-07-18 NOTE — ASSESSMENT & PLAN NOTE
Patient with Hypoxic Respiratory failure which is Acute on chronic.  he is not on home oxygen. Supplemental oxygen was provided and noted- Oxygen Concentration (%):  [32] 32    .   Signs/symptoms of respiratory failure include- tachypnea and increased work of breathing. Contributing diagnoses includes - COPD Labs and images were reviewed. Patient Has not had a recent ABG, but ordered. Will treat underlying causes and adjust management of respiratory failure as follows- steroids, DuoNebs, Trelegy, Pulmonary consult    -new diagnosis of COPD.  CT chest shows emphysema and pulmonary nodule.  -echocardiogram was considered but patient had a stress test 1 month ago that showed an EF of 65% and BNP <10  -repeat chest x-ray did not show any vascular congestion   -although chest x-ray was negative for volume 1 dose of Lasix was ordered as well as increase in steroids per recommendation of pulmonology.

## 2025-07-18 NOTE — ASSESSMENT & PLAN NOTE
-A1c 1 month ago was 6.5 but patient does not appear to be on any home diabetic medications   -SSI and Accu-Cheks   -diabetic diet   -monitor glucose closely as patient will be on steroids for his COPD exacerbation  -consult nutrition and diabetic educator  -patient has been counseled to follow up with his primary care doctor as he is has an elevated A1c.  Patient has been sent on metformin 500 mg b.i.d. for 30 days that should be followed up with his primary care physician.

## 2025-07-18 NOTE — PROGRESS NOTES
[facility])  Pulmonology  Progress Note    Patient Name: Yazan Hathaway  MRN: [id number]  Admission Date: 7/15/2025  Hospital Length of Stay: 2 days  Code Status: Full Code  Attending Provider: David Pike MD  Primary Care Provider: Simi Johnson MD   Principal Problem: Acute hypoxic respiratory failure    Subjective:   50 yr old with asthma and smoking hx presents with SOB. We are being consulted for a pulmonary nodule RUL. Per chart review, patient still ppd smoker. Recently saw allergist for asthmatic type symptoms and was told he likely has COPD. Patient was referred to pulmonology and had appointment in August. Patient was also scheduled for routine CT chest but did not get completed. Recently prescribed albuterol with no relief. Describes worsening sob and wheezing with activity and also lying flat. CT chest with moderate to severe emphysema and RUL spiculated pulmonary nodule measuring 1cm. Patient was started on treatment for COPD exacerbation including nebs, steroids, abx, supplemental O2.     7/16/2025: Patient on 1L NC. Looks comfortable upon entering room but gets mildly tachypnea when awakening. Complains of productive cough and worsening sob with coughing fits/lying flat.     7/17/2025: Back and forth from 1 to 3L throughout day although sats >92%. Still complaining of sob with movement, getting oob. Productive cough. Sputum culture collected. Repeat echo wnl.     7/18/2025: Looks better this morning. No wheezing on exam. Sats 98% on 3L.     Objective:     Vital Signs (Most Recent):  Temp: 98 °F (36.7 °C) (07/18/25 0730)  Pulse: 87 (07/18/25 0800)  Resp: 20 (07/18/25 0730)  BP: 118/88 (07/18/25 0730)  SpO2: 98 % (07/18/25 0730) Vital Signs (24h Range):  Temp:  [97.7 °F (36.5 °C)-98.6 °F (37 °C)] 98 °F (36.7 °C)  Pulse:  [80-97] 87  Resp:  [18-20] 20  SpO2:  [91 %-98 %] 98 %  BP: (116-121)/(77-88) 118/88     Weight: 60.1 kg (132 lb 7.9 oz)  Body mass index is 25.88  kg/m².  Intake/Output Summary (Last 24 hours) at 7/18/2025 1055  Last data filed at 7/17/2025 1753  Gross per 24 hour   Intake 500 ml   Output --   Net 500 ml     Physical Exam  Constitutional:       General: He is not in acute distress.  HENT:      Head: Normocephalic.      Mouth/Throat:      Mouth: Mucous membranes are moist.   Eyes:      Conjunctiva/sclera: Conjunctivae normal.      Pupils: Pupils are equal, round, and reactive to light.   Cardiovascular:      Pulses: Normal pulses.   Pulmonary:      Effort: Pulmonary effort is normal. No respiratory distress.      Breath sounds: Rales present. No wheezing.   Abdominal:      Palpations: Abdomen is soft.   Musculoskeletal:         General: No swelling.   Skin:     General: Skin is warm.      Capillary Refill: Capillary refill takes less than 2 seconds.      Coloration: Skin is not jaundiced.      Findings: No bruising.   Neurological:      Mental Status: He is alert and oriented to person, place, and time.   Psychiatric:         Mood and Affect: Mood normal.         Behavior: Behavior normal.     Review of Systems   Constitutional:  Negative for chills, fatigue and fever.   Respiratory:  Positive for cough and shortness of breath. Negative for chest tightness.    Cardiovascular:  Negative for chest pain.   Gastrointestinal:  Negative for abdominal pain, nausea and vomiting.   Genitourinary:  Negative for dysuria.   Neurological:  Negative for dizziness and headaches.   Psychiatric/Behavioral:  Negative for agitation and confusion.      Vents:  Oxygen Concentration (%): 32 (07/18/25 0722)    Lines/Drains/Airways       Peripheral Intravenous Line  Duration             Peripheral IV Single Lumen 07/15/25 1438 20 G Anterior;Left Forearm 2 days    Peripheral IV Single Lumen 07/15/25 1440 20 G 1 in Anterior;Right Forearm 2 days                  Significant Labs:    CBC/Anemia Profile:  Recent Labs   Lab 07/17/25  0429 07/18/25  0448   WBC 8.28 5.86   HGB 16.6 17.2   HCT  50.8 51.3    194   MCV 89 86   RDW 13.9 13.7     Chemistries:  Recent Labs   Lab 07/17/25  0429 07/18/25  0448    139   K 4.1 3.9    99   CO2 24 27   BUN 20 33*   CREATININE 0.7 0.8   CALCIUM 9.4 9.5   MG 2.1 2.6   PHOS 3.7 4.8*     Significant Imaging:  I have reviewed all pertinent imaging results/findings within the past 24 hours.  I have reviewed and interpreted all pertinent imaging results/findings within the past 24 hours.    ABG  Recent Labs   Lab 07/15/25  1846   PH 7.371   PO2 110*   PCO2 44.6   HCO3 25.9   BE 1     Assessment & Plan  Acute hypoxic respiratory failure  COPD exacerbation  - Patient with Hypoxic Respiratory failure which is Acute. He is not on home oxygen. Supplemental oxygen was provided and noted- Oxygen Concentration (%):  [32] 32  - Signs/symptoms of respiratory failure include: tachypnea, increased work of breathing, respiratory distress, and use of accessory muscles.   - Contributing diagnoses includes - COPD Labs and images were reviewed. Patient Has not had a recent ABG.   - Agree with hospitalist orders  - Continue with supplemental O2 as needed, currently 1L  - Still complains of sob with cough/lying flat   - No wheezing currently on exam  - Continue with LABA/ICS + prn nebs  - Recheck echo  - Productive cough, order sputum cx; continue empiric abx for now  - OOB as able, IS   - 7/17: Patient oxygenation is okay but still with some intermittent wob/tachypnea. Repeat echo wnl. Some crackles at bases but otherwise no wheezing, good air movement. Repeat CXR with no changes. Sputum culture is pending. Continue to follow and continue abx for now. Can increase dose solu-medrol, +/- lasix and monitor response. Home O2 eval has been completed. [facility] pulmonology referral has been made.   - 7/18: Looks and feels better today. Going home with O2 supplementation. Will continue steroid taper. Ambulatory referral to [facility] pulm has been sent. Discussed importance of follow up to  manage COPD and follow up imaging. Will continue home Trelegy and prn albuterol.   Pulmonary nodule 1 cm or greater in diameter  - In AECOPD, not candidate for intervention at this moment  - Will need pulm establishment and follow up in clinic for further testing and plan of action.      Pulmonary service will sign off but will remain available. Please call with any further questions or concerns.      Waqas Coon PA-C  Pulmonology  [facility] - Telemetry ([facility])

## 2025-07-20 LAB
BACTERIA BLD CULT: NORMAL
BACTERIA BLD CULT: NORMAL

## 2025-07-22 ENCOUNTER — OFFICE VISIT (OUTPATIENT)
Dept: FAMILY MEDICINE | Facility: CLINIC | Age: 50
End: 2025-07-22
Payer: MEDICAID

## 2025-07-22 VITALS
DIASTOLIC BLOOD PRESSURE: 60 MMHG | TEMPERATURE: 97 F | HEART RATE: 68 BPM | HEIGHT: 64 IN | SYSTOLIC BLOOD PRESSURE: 100 MMHG | BODY MASS INDEX: 23.34 KG/M2 | WEIGHT: 136.69 LBS | OXYGEN SATURATION: 98 %

## 2025-07-22 DIAGNOSIS — J43.9 PULMONARY EMPHYSEMA, UNSPECIFIED EMPHYSEMA TYPE: Primary | ICD-10-CM

## 2025-07-22 DIAGNOSIS — R91.1 PULMONARY NODULE 1 CM OR GREATER IN DIAMETER: ICD-10-CM

## 2025-07-22 DIAGNOSIS — B37.0 ORAL CANDIDIASIS: ICD-10-CM

## 2025-07-22 DIAGNOSIS — E78.5 HYPERLIPIDEMIA, UNSPECIFIED HYPERLIPIDEMIA TYPE: Chronic | ICD-10-CM

## 2025-07-22 DIAGNOSIS — E11.9 TYPE 2 DIABETES MELLITUS WITHOUT COMPLICATION, WITHOUT LONG-TERM CURRENT USE OF INSULIN: Chronic | ICD-10-CM

## 2025-07-22 PROBLEM — J96.01 SEPSIS WITH ACUTE HYPOXIC RESPIRATORY FAILURE WITHOUT SEPTIC SHOCK: Status: RESOLVED | Noted: 2025-07-15 | Resolved: 2025-07-22

## 2025-07-22 PROBLEM — R65.20 SEPSIS WITH ACUTE HYPOXIC RESPIRATORY FAILURE WITHOUT SEPTIC SHOCK: Status: RESOLVED | Noted: 2025-07-15 | Resolved: 2025-07-22

## 2025-07-22 PROBLEM — A41.9 SEPSIS WITH ACUTE HYPOXIC RESPIRATORY FAILURE WITHOUT SEPTIC SHOCK: Status: RESOLVED | Noted: 2025-07-15 | Resolved: 2025-07-22

## 2025-07-22 PROBLEM — J96.01 ACUTE HYPOXIC RESPIRATORY FAILURE: Status: RESOLVED | Noted: 2025-07-15 | Resolved: 2025-07-22

## 2025-07-22 PROBLEM — E66.3 OVERWEIGHT WITH BODY MASS INDEX (BMI) OF 25 TO 25.9 IN ADULT: Status: RESOLVED | Noted: 2023-01-24 | Resolved: 2025-07-22

## 2025-07-22 PROBLEM — J44.1 COPD EXACERBATION: Status: RESOLVED | Noted: 2025-07-16 | Resolved: 2025-07-22

## 2025-07-22 PROCEDURE — 99214 OFFICE O/P EST MOD 30 MIN: CPT | Mod: S$PBB,,, | Performed by: FAMILY MEDICINE

## 2025-07-22 PROCEDURE — 3066F NEPHROPATHY DOC TX: CPT | Mod: CPTII,,, | Performed by: FAMILY MEDICINE

## 2025-07-22 PROCEDURE — 99214 OFFICE O/P EST MOD 30 MIN: CPT | Mod: PBBFAC,PO | Performed by: FAMILY MEDICINE

## 2025-07-22 PROCEDURE — 3061F NEG MICROALBUMINURIA REV: CPT | Mod: CPTII,,, | Performed by: FAMILY MEDICINE

## 2025-07-22 PROCEDURE — 1111F DSCHRG MED/CURRENT MED MERGE: CPT | Mod: CPTII,,, | Performed by: FAMILY MEDICINE

## 2025-07-22 PROCEDURE — G2211 COMPLEX E/M VISIT ADD ON: HCPCS | Mod: ,,, | Performed by: FAMILY MEDICINE

## 2025-07-22 PROCEDURE — 3074F SYST BP LT 130 MM HG: CPT | Mod: CPTII,,, | Performed by: FAMILY MEDICINE

## 2025-07-22 PROCEDURE — 99999 PR PBB SHADOW E&M-EST. PATIENT-LVL IV: CPT | Mod: PBBFAC,,, | Performed by: FAMILY MEDICINE

## 2025-07-22 PROCEDURE — 3044F HG A1C LEVEL LT 7.0%: CPT | Mod: CPTII,,, | Performed by: FAMILY MEDICINE

## 2025-07-22 PROCEDURE — 3078F DIAST BP <80 MM HG: CPT | Mod: CPTII,,, | Performed by: FAMILY MEDICINE

## 2025-07-22 PROCEDURE — 3008F BODY MASS INDEX DOCD: CPT | Mod: CPTII,,, | Performed by: FAMILY MEDICINE

## 2025-07-22 PROCEDURE — 1159F MED LIST DOCD IN RCRD: CPT | Mod: CPTII,,, | Performed by: FAMILY MEDICINE

## 2025-07-22 RX ORDER — FLUCONAZOLE 100 MG/1
100 TABLET ORAL DAILY
Qty: 10 TABLET | Refills: 0 | Status: SHIPPED | OUTPATIENT
Start: 2025-07-22

## 2025-07-22 NOTE — PROGRESS NOTES
CHIEF COMPLAINT:  This is a 50-year-old male here for follow up hospitalization.    SUBJECTIVE:  The patient is accompanied today by his daughter.  He presented to the ED on July 15 with increasing shortness of breath.  CT scan of the lungs showed moderate to severe emphysema and a 1 cm spiculated right upper lobe pulmonary nodule.  He was treated with oxygen and IV steroids.  He was also given 1 dose of Lasix.  He was discharged from the hospital after 2 days on home O2 at 4 L per nasal cannula and tapering course of oral prednisone.  He completed Z-Goran.  He complains of persistent weakness but his daughter states that shortness of breath has improved.  Home O2 has been decreased to 3 L/min with O2 saturations of 98%.  He was unable to tolerate 4 L/min.  His daughter has questions regarding Trelegy Ellipta inhaler which was prescribed by allergist.  The medication caused swelling around his lips and pain, swelling and whitish lesions of oral mucosa.  She attributes this to not being instructed to rinse out his mouth after using inhaler.  However she also states his shortness of breath seemed to get worse after use of inhaler.  The patient smokes 1 pack per day and has done so for 30 years.  He quit smoking with recent hospitalization.  He has lost 10 lb since last last seen.  He has a BMI of 23.46.      Repeat CT scan of the chest is scheduled for July 28.  He has not been scheduled for pulmonary consult.    The patient has type 2 diabetes which is controlled by diet.  Last A1c was 6.5% 1 month ago.  He takes atorvastatin 10 mg daily for hyperlipidemia.  The patient has osteoarthritis of right knee.     ROS:  GENERAL:  Patient denies fever, chills, night sweats.  Patient denies weight gain or loss. Patient denies swollen glands.  Positive for decreased appetite, fatigue and weakness.  SKIN: Patient denies rash or hair loss.  HEENT:  Patient denies sore throat, ear pain, hearing loss, nasal congestion, or runny  nose. Patient denies visual disturbance, eye irritation or discharge.  LUNGS: Patient denies cough, wheeze or hemoptysis.  CARDIOVASCULAR: Patient denies chest pain, palpitations, syncope or lower extremity edema.  Positive for shortness for breath.  GI:  Patient denies abdominal pain, nausea, vomiting, diarrhea, constipation, blood in stool or melena.  GENITOURINARY:  Patient denies dysuria, frequency, hematuria, nocturia, urgency or incontinence.  MUSCULOSKELETAL: Patient denies joint pain, swelling, redness or warmth.  NEUROLOGIC: Patient denies headache, vertigo, paresthesias, weakness in limb, dysarthria, dysphagia or abnormality of gait.  PSYCHIATRIC: Patient denies anxiety, depression, or memory loss.     OBJECTIVE:   GENERAL:  Well-developed well-nourished male alert and oriented x3, in no acute distress.  Memory, judgment and cognition without deficit.  Accompanied by his daughter who interprets.  SKIN: Clear without rash.  Normal color and tone.  HEENT: Eyes: Clear conjunctivae.  No scleral icterus.  Ears:  Clear canals.  Clear TMs.  Nose: Without congestion.  Pharynx:  Diffuse erythema with patchy white splotches on gums and oral mucosa.  NECK: Supple, normal range of motion.  No masses, nodes or enlarged thyroid.  No JVD.  Carotids 2+ and equal.  No bruits.  LUNGS:  Decreased breath sounds.  Normal respiratory effort.  O2 saturation 98% on O2 3 L/min.  CARDIOVASCULAR: Regular rhythm, normal S1, S2 without murmur, gallop or rub.  EXTREMITIES: Without cyanosis, clubbing or edema.  Distal pulses 2+ and equal.  Normal range of motion in all extremities.  No joint effusion, erythema or warmth.  NEUROLOGIC:  Gait without abnormality.  No tremor.      ASSESSMENT:  1. Pulmonary emphysema, unspecified emphysema type    2. Pulmonary nodule 1 cm or greater in diameter    3. Oral candidiasis    4. Type 2 diabetes mellitus without complication, without long-term current use of insulin    5. Hyperlipidemia,  unspecified hyperlipidemia type      PLAN:  1. Complete prednisone.    2. Hold Trelegy Ellipta until reviewed by pulmonologist.    3. Fluconazole 100 mg daily for 10 days.    4. Use albuterol inhaler as needed.    5. Pulmonary consult.    30 minutes of total time spent on the encounter, which includes face to face time and non-face to face time preparing to see the patient (eg, review of tests), Obtaining and/or reviewing separately obtained history, Documenting clinical information in the electronic or other health record, Independently interpreting results (not separately reported) and communicating results to the patient/family/caregiver, or Care coordination (not separately reported).     This note is generated with speech recognition software and is subject to transcription error and sound alike phrases that may be missed by proofreading.

## 2025-07-31 ENCOUNTER — OFFICE VISIT (OUTPATIENT)
Dept: PULMONOLOGY | Facility: CLINIC | Age: 50
End: 2025-07-31
Payer: MEDICAID

## 2025-07-31 VITALS
WEIGHT: 139.75 LBS | OXYGEN SATURATION: 95 % | HEIGHT: 64 IN | SYSTOLIC BLOOD PRESSURE: 125 MMHG | HEART RATE: 62 BPM | RESPIRATION RATE: 18 BRPM | DIASTOLIC BLOOD PRESSURE: 72 MMHG | BODY MASS INDEX: 23.86 KG/M2

## 2025-07-31 DIAGNOSIS — J43.9 PULMONARY EMPHYSEMA, UNSPECIFIED EMPHYSEMA TYPE: ICD-10-CM

## 2025-07-31 DIAGNOSIS — R91.1 LUNG NODULE: Primary | ICD-10-CM

## 2025-07-31 DIAGNOSIS — R91.1 PULMONARY NODULE 1 CM OR GREATER IN DIAMETER: ICD-10-CM

## 2025-07-31 PROCEDURE — 99214 OFFICE O/P EST MOD 30 MIN: CPT | Mod: PBBFAC | Performed by: INTERNAL MEDICINE

## 2025-07-31 PROCEDURE — 99999 PR PBB SHADOW E&M-EST. PATIENT-LVL IV: CPT | Mod: PBBFAC,,, | Performed by: INTERNAL MEDICINE

## 2025-07-31 RX ORDER — UMECLIDINIUM BROMIDE AND VILANTEROL TRIFENATATE 62.5; 25 UG/1; UG/1
1 POWDER RESPIRATORY (INHALATION) DAILY
Qty: 60 EACH | Refills: 6 | Status: SHIPPED | OUTPATIENT
Start: 2025-07-31

## 2025-07-31 NOTE — PROGRESS NOTES
Subjective:      Patient ID: Yazan Hathaway is a 50 y.o. male.    Chief Complaint: COPD      COPD      50-year-old [place] male recently hospitalized with upper respiratory illness and possible COPD exacerbation.  He was treated with intravenous and subsequently p.o. steroids as well as antibiotics.  He was discharged on Trelegy inhaler but developed thrush and discontinued this.  He was also discharged with home oxygen but currently not using it during the day only at night.  During this hospital stay he had a CT of the chest which showed a suspicious right upper lobe spiculated nodule about 7.5 mm.  He was also noted to have some emphysema.  He is here today with his wife for follow up of this.  States that overall his respiratory status has improved significantly and since being discharged from the hospital he has quit smoking.  He denies any ongoing fever, chills, chest pain or purulent sputum.    Problem List[1]    Past Surgical History:   Procedure Laterality Date    COLONOSCOPY N/A 9/19/2024    Procedure: COLONOSCOPY;  Surgeon: Melania Muñiz MD;  Location: [facility];  Service: Gastroenterology;  Laterality: N/A;    NONE       Social History[2]    Family History   Problem Relation Name Age of Onset    Diabetes Mother      Hypertension Brother      Diabetes Brother      Allergies Son           Review of Systems as per HPI otherwise negative    Objective:     Physical Exam   Constitutional: He is oriented to person, place, and time. He appears well-developed. No distress.   HENT:   Head: Normocephalic.   Cardiovascular: Normal rate and regular rhythm.   Pulmonary/Chest: Normal expansion and effort normal. He has no wheezes. He has no rales.   Musculoskeletal:      Cervical back: Neck supple.   Neurological: He is alert and oriented to person, place, and time.   Psychiatric: He has a normal mood and affect.   Nursing note and vitals reviewed.            7/31/2025    11:06 AM 7/22/2025     1:04 PM  "7/18/2025    11:00 AM 7/18/2025     7:30 AM 7/18/2025     7:19 AM 7/18/2025     4:40 AM 7/17/2025    11:49 PM   Pulmonary Function Tests   SpO2 95 % 98 % 95 % 98 % 94 % 94 % 95 %   Height 5' 4" (1.626 m) 5' 4" (1.626 m)        Weight 63.4 kg (139 lb 12.4 oz) 62 kg (136 lb 11 oz)    60.1 kg (132 lb 7.9 oz)    BMI (Calculated) 24 23.5    25.9         Assessment:     1. Lung nodule    2. Pulmonary emphysema, unspecified emphysema type    3. Pulmonary nodule 1 cm or greater in diameter        Orders Placed This Encounter   Procedures    CT Chest Without Contrast     Standing Status:   Future     Expected Date:   10/31/2025     Expiration Date:   7/31/2026     May the Radiologist modify the order per protocol to meet the clinical needs of the patient?:   Yes    Complete PFT with bronchodilator     Standing Status:   Future     Expected Date:   10/31/2025     Expiration Date:   7/31/2026     I personally reviewed CT chest images.  I agree with the radiologist's interpretation as below    EXAM:  CT CHEST WITHOUT CONTRAST     CLINICAL INDICATION: Solitary pulmonary nodule.  Abnormal chest x-ray.     TECHNIQUE: Chest CT without contrast.  Soft tissue and lung algorithms.  Coronal and sagittal reformations.     COMPARISON STUDY:   Chest x-ray 07/15/2025, report only, images unavailable, chest x-ray 03/15/2025.     FINDINGS: Normal size heart.  There is no coronary artery calcification.  No pericardial effusion.  Normal aorta.     There is no mediastinal or hilar lymphadenopathy.     Trachea and central bronchi are patent.     There is respiratory motion artifact limiting detail, particularly at the lung bases.  There is moderate to severe emphysema, upper lobe involvement.  There is a right upper lobe spiculated pulmonary nodule, 1 x 0.7 x 0.9 cm (images 122-131 series 4; images 124-128 series 602).  No additional pulmonary nodule.  No active infiltrate.     The visualized portion upper abdominal viscera is unremarkable than " a left lobe liver cysts, 1.9 cm.     Multilevel mild thoracic spondylosis.           Impression:   Moderate to severe emphysema.  Worrisome spiculated right upper lobe pulmonary nodule, 1 cm.  Recommend follow-up according to the Fleischner Society criteria.    Plan:     Continue to abstain from smoking  We will change daily combination inhaler to Anoro once daily  Continue to wean oxygen based on pulse oximetry at home  We will send [employer]  Biopsy would carry some risk given the surrounding emphysema  In the setting of acute  respiratory illness, pulmonary infiltrates are not unexpected  Calculated risk is 24% for malignancy  We will have him return for short-term follow up CT in 3 months PFTs and visit same-day  I personally reviewed the above with the patient and/or family including test and radiology results. They voiced understanding and agreement with the above. Questions were answered to their apparent satisfaction.           [1]   Patient Active Problem List  Diagnosis    Hyperlipidemia    Type 2 diabetes mellitus without complication, without long-term current use of insulin    Colon polyps    Diverticulosis    Tobacco use disorder    Pulmonary nodule 1 cm or greater in diameter    Pulmonary emphysema   [2]   Social History  Tobacco Use    Smoking status: Former     Current packs/day: 0.00     Average packs/day: 1 pack/day for 30.0 years (30.0 ttl pk-yrs)     Types: Cigarettes     Quit date: 2025     Years since quittin.0    Smokeless tobacco: Never   Substance Use Topics    Alcohol use: Not Currently     Comment: Monthly or less    Drug use: Never

## 2025-08-06 ENCOUNTER — TELEPHONE (OUTPATIENT)
Dept: PULMONOLOGY | Facility: CLINIC | Age: 50
End: 2025-08-06
Payer: MEDICAID

## 2025-08-06 NOTE — TELEPHONE ENCOUNTER
Copied from CRM #[id number]. Topic: General Inquiry - Patient Advice  >> Aug 6, 2025 11:54 AM Maryam wrote:  Dominique/[employer] calling to request confirmation to proceed testing outside validation. Please make a selection and fax [phone], call [phone] Option 1

## 2025-08-19 ENCOUNTER — PATIENT MESSAGE (OUTPATIENT)
Dept: ADMINISTRATIVE | Facility: HOSPITAL | Age: 50
End: 2025-08-19
Payer: MEDICAID

## 2025-08-26 ENCOUNTER — TUMOR BOARD CONFERENCE (OUTPATIENT)
Dept: PULMONOLOGY | Facility: CLINIC | Age: 50
End: 2025-08-26
Payer: MEDICAID